# Patient Record
Sex: FEMALE | Race: WHITE | NOT HISPANIC OR LATINO | Employment: OTHER | URBAN - METROPOLITAN AREA
[De-identification: names, ages, dates, MRNs, and addresses within clinical notes are randomized per-mention and may not be internally consistent; named-entity substitution may affect disease eponyms.]

---

## 2017-02-22 ENCOUNTER — TELEPHONE (OUTPATIENT)
Dept: FAMILY MEDICINE | Facility: CLINIC | Age: 67
End: 2017-02-22

## 2017-02-23 ENCOUNTER — TRANSFERRED RECORDS (OUTPATIENT)
Dept: FAMILY MEDICINE | Facility: CLINIC | Age: 67
End: 2017-02-23

## 2017-04-03 ENCOUNTER — OFFICE VISIT (OUTPATIENT)
Dept: FAMILY MEDICINE | Facility: CLINIC | Age: 67
End: 2017-04-03

## 2017-04-03 VITALS
SYSTOLIC BLOOD PRESSURE: 104 MMHG | BODY MASS INDEX: 35.82 KG/M2 | TEMPERATURE: 98.2 F | WEIGHT: 202.2 LBS | HEART RATE: 86 BPM | OXYGEN SATURATION: 98 % | DIASTOLIC BLOOD PRESSURE: 66 MMHG | RESPIRATION RATE: 16 BRPM

## 2017-04-03 DIAGNOSIS — K52.831 COLLAGENOUS COLITIS: Primary | ICD-10-CM

## 2017-04-03 PROCEDURE — 99213 OFFICE O/P EST LOW 20 MIN: CPT | Performed by: FAMILY MEDICINE

## 2017-04-03 RX ORDER — BUDESONIDE 3 MG/1
9 CAPSULE, COATED PELLETS ORAL EVERY MORNING
Qty: 90 CAPSULE | Refills: 0 | Status: SHIPPED | OUTPATIENT
Start: 2017-04-03 | End: 2017-07-10

## 2017-04-03 NOTE — PROGRESS NOTES
"Has abnormal BMs    Bowel movements \"okay BUT\"    miralax for constipation  Is unhappy with the Bowel movements   Mucous in stool and loose   Has known internal hemorrhoids and has skin tags    Has been having some blood and when passing gas has loose bm    ro s  no weight loss   No black stools   no abd pain  Blood is on the TP  notin the stool    Past Medical History:   Diagnosis Date     CAD (coronary artery disease)     based on CT angio multiple vessels     Collagenous colitis 2010    Documented on colonoscopy with biopsy     Ex-smoker      Gastro-oesophageal reflux disease      History of stroke 2009    L MCA , had embolectomy and Tpa     Hyperlipidemia LDL goal < 100      OA (osteoarthritis) of knee     ref  to [facility] 10/11// Dr MARK Tobin in 2014     Osteopenia     fosamax started 2011- will use until 2016     PFO (patent foramen ovale)     no treatment recommended, but pt could choose to close     Unspecified cerebral artery occlusion with cerebral infarction     2009     Past Surgical History:   Procedure Laterality Date     ARTHROPLASTY KNEE  7/8/2014    Procedure: ARTHROPLASTY KNEE;  Surgeon: Brian Tobin MD;  [facility]     ARTHROPLASTY KNEE Right 9/9/2014    Procedure: ARTHROPLASTY KNEE;  Surgeon: Brian Tobin MD;  Location: [facility]     JOINT REPLACEMENT, HIP RT/LT Right     Joint Replacement Hip RT/LT     Current Outpatient Prescriptions   Medication     budesonide (ENTOCORT EC) 3 MG EC capsule     Calcium-Vitamin D 600-200 MG-UNIT TABS     aspirin 325 MG tablet     omeprazole (PRILOSEC) 40 MG capsule     simvastatin (ZOCOR) 10 MG tablet     Cholecalciferol (VITAMIN D3 PO)     polyethylene glycol (MIRALAX) powder     [DISCONTINUED] simvastatin (ZOCOR) 10 MG tablet     No current facility-administered medications for this visit.        /66 (Cuff Size: Adult Large)  Pulse 86  Temp 98.2  F (36.8  C) (Oral)  Resp 16  Wt 91.7 kg (202 lb 3.2 oz)  SpO2 98%  BMI 35.82 " "kg/m2      VS noted obese  Looks well  EYES = NL  EARS= NL  MOUTH =NL  NECK = NL  LUNGS=NL  COR=NL  ABD=NL, obese soft NT no masses  EXT=NL   MOOD AFFECT =NL  .med    .prudencio  (K52.831) Collagenous colitis  (primary encounter diagnosis)  LIKELY as this is what she had before and this is the \"SAME\"  Plan: budesonide (ENTOCORT EC) 3 MG EC capsule  3 capsules a day.  rtc in two week    If not resolved needs repeat colonoscopy     Jaylen Hubbard MD        "

## 2017-04-03 NOTE — MR AVS SNAPSHOT
"              After Visit Summary   4/3/2017    Kylee Cavazos    MRN: [id number]           Patient Information     Date Of Birth          1950        Visit Information        Provider Department      4/3/2017 9:30 AM Moe Moody MD [facility]        Today's Diagnoses     Collagenous colitis    -  1       Follow-ups after your visit        Who to contact     If you have questions or need follow up information about today's clinic visit or your schedule please contact [facility] directly at [phone].  Normal or non-critical lab and imaging results will be communicated to you by Leon[employer] World[employer] Corporationhart, letter or phone within 4 business days after the clinic has received the results. If you do not hear from us within 7 days, please contact the clinic through [employer] or phone. If you have a critical or abnormal lab result, we will notify you by phone as soon as possible.  Submit refill requests through Zinch or call your pharmacy and they will forward the refill request to us. Please allow 3 business days for your refill to be completed.          Additional Information About Your Visit        MyChart Information     Zinch lets you send messages to your doctor, view your test results, renew your prescriptions, schedule appointments and more. To sign up, go to [email or web address] . Click on \"Log in\" on the left side of the screen, which will take you to the Welcome page. Then click on \"Sign up Now\" on the right side of the page.     You will be asked to enter the access code listed below, as well as some personal information. Please follow the directions to create your username and password.     Your access code is: [email or web address]  Expires: 2017  9:56 AM     Your access code will  in 90 days. If you need help or a new code, please call your [facility] or [phone].        Care EveryWhere ID     This is your Care EveryWhere ID. This could be used by other " organizations to access your [place] medical records  [id number]        Your Vitals Were     Pulse Temperature Respirations Pulse Oximetry BMI (Body Mass Index)       86 98.2  F (36.8  C) (Oral) 16 98% 35.82 kg/m2        Blood Pressure from Last 3 Encounters:   04/03/17 104/66   10/20/16 110/72   10/06/16 118/68    Weight from Last 3 Encounters:   04/03/17 91.7 kg (202 lb 3.2 oz)   10/06/16 90.2 kg (198 lb 12.8 oz)   07/08/15 87.1 kg (192 lb)              Today, you had the following     No orders found for display         Today's Medication Changes          These changes are accurate as of: 4/3/17  9:56 AM.  If you have any questions, ask your nurse or doctor.               Start taking these medicines.        Dose/Directions    budesonide 3 MG EC capsule   Commonly known as:  ENTOCORT EC   Used for:  Collagenous colitis   Started by:  Moe Moody MD        Dose:  9 mg   Take 3 capsules (9 mg) by mouth every morning   Quantity:  90 capsule   Refills:  0            Where to get your medicines      These medications were sent to [facility] [address] [place] [address] [address] [address]    Hours:  24-hours Phone:  [phone]     budesonide 3 MG EC capsule                Primary Care Provider Office Phone # Fax #    Moe Moody [phone]       [facility] [address] [place] [address]        Thank you!     Thank you for choosing [facility]  for your care. Our goal is always to provide you with excellent care. Hearing back from our patients is one way we can continue to improve our services. Please take a few minutes to complete the written survey that you may receive in the mail after your visit with us. Thank you!             Your Updated Medication List - Protect others around you: Learn how to safely use, store and throw away your medicines at www.disposemymeds.org.          This  list is accurate as of: 4/3/17  9:56 AM.  Always use your most recent med list.                   Brand Name Dispense Instructions for use    aspirin 325 MG tablet     20 tablet    Take 1 tablet (325 mg) by mouth daily       budesonide 3 MG EC capsule    ENTOCORT EC    90 capsule    Take 3 capsules (9 mg) by mouth every morning       Calcium-Vitamin D 600-200 MG-UNIT Tabs      Take 2 tablets by mouth daily       MIRALAX powder   Generic drug:  polyethylene glycol      Take 1 capful by mouth daily.       omeprazole 40 MG capsule    priLOSEC    90 capsule    TAKE ONE CAPSULE BY MOUTH ONCE DAILY 30 TO 60 MINUTES BEFORE A MEAL       simvastatin 10 MG tablet    ZOCOR    90 tablet    Take 1 tablet (10 mg) by mouth At Bedtime       VITAMIN D3 PO      Take 4,000 Units by mouth daily

## 2017-04-17 ENCOUNTER — OFFICE VISIT (OUTPATIENT)
Dept: FAMILY MEDICINE | Facility: CLINIC | Age: 67
End: 2017-04-17

## 2017-04-17 VITALS
DIASTOLIC BLOOD PRESSURE: 70 MMHG | OXYGEN SATURATION: 99 % | WEIGHT: 200 LBS | BODY MASS INDEX: 35.43 KG/M2 | SYSTOLIC BLOOD PRESSURE: 116 MMHG | HEART RATE: 57 BPM

## 2017-04-17 DIAGNOSIS — K52.831 COLLAGENOUS COLITIS: ICD-10-CM

## 2017-04-17 DIAGNOSIS — K62.5 RECTAL BLEEDING: ICD-10-CM

## 2017-04-17 DIAGNOSIS — K63.9 BOWEL TROUBLE: Primary | ICD-10-CM

## 2017-04-17 LAB
% GRANULOCYTES: 63.1 % (ref 42.2–75.2)
HCT VFR BLD AUTO: 37.4 % (ref 35–46)
HEMOGLOBIN: 12.1 G/DL (ref 11.8–15.5)
LYMPHOCYTES NFR BLD AUTO: 26.8 % (ref 20.5–51.1)
MCH RBC QN AUTO: 29.5 PG (ref 27–31)
MCHC RBC AUTO-ENTMCNC: 32.6 G/DL (ref 33–37)
MCV RBC AUTO: 90.5 FL (ref 80–100)
MONOCYTES NFR BLD AUTO: 10.1 % (ref 1.7–9.3)
PLATELET # BLD AUTO: 284 K/UL (ref 140–450)
RBC # BLD AUTO: 4.12 X10/CMM (ref 3.7–5.2)
WBC # BLD AUTO: 5.9 X10/CMM (ref 3.8–11)

## 2017-04-17 PROCEDURE — 85025 COMPLETE CBC W/AUTO DIFF WBC: CPT | Performed by: FAMILY MEDICINE

## 2017-04-17 PROCEDURE — 84443 ASSAY THYROID STIM HORMONE: CPT | Mod: 90 | Performed by: FAMILY MEDICINE

## 2017-04-17 PROCEDURE — 99213 OFFICE O/P EST LOW 20 MIN: CPT | Performed by: FAMILY MEDICINE

## 2017-04-17 PROCEDURE — 36415 COLL VENOUS BLD VENIPUNCTURE: CPT | Performed by: FAMILY MEDICINE

## 2017-04-17 NOTE — LETTER
[facility] [address] [place]  [address]  Phone: [phone]    April 18, 2017      [place] [address] [place] [address]              Dear Kylee,    The thyroid and the CBC are normal. Follow up with GI as planned.       Sincerely,     Moe Hubbard M.D.    Results for orders placed or performed in visit on 04/17/17   CBC with Diff/Plt ([facility])   Result Value Ref Range    WBC x10/cmm 5.9 3.8 - 11.0 x10/cmm    % Lymphocytes 26.8 20.5 - 51.1 %    % Monocytes 10.1 (A) 1.7 - 9.3 %    % Granulocytes 63.1 42.2 - 75.2 %    RBC x10/cmm 4.12 3.7 - 5.2 x10/cmm    Hemoglobin 12.1 11.8 - 15.5 g/dl    Hematocrit 37.4 35 - 46 %    MCV 90.5 80 - 100 fL    MCH 29.5 27.0 - 31.0 pg    MCHC 32.6 (A) 33.0 - 37.0 g/dL    Platelet Count 284 140 - 450 K/uL   TSH ([facility])   Result Value Ref Range    TSH 2.630 0.450 - 4.500 uIU/mL    Narrative    Performed at:  01 - [facility] [address], [place], [place]  [id number]  : Ruy Arango MD, Phone:  [phone]

## 2017-04-17 NOTE — MR AVS SNAPSHOT
After Visit Summary   4/17/2017    Kylee Cavazos    MRN: [id number]           Patient Information     Date Of Birth          1950        Visit Information        Provider Department      4/17/2017 9:30 AM Moe Moody MD [facility]        Today's Diagnoses     Bowel trouble    -  1    Colitis        Rectal bleeding           Follow-ups after your visit        Additional Services     GASTROENTEROLOGY ADULT REF CONSULT ONLY       Preferred Location: [facility] [phone]   Colonoscopy followed by meeting with  gastroenerologist .    Please be aware that coverage of these services is subject to the terms and limitations of your health insurance plan.  Call member services at your health plan with any benefit or coverage questions.  Any procedures must be performed at a [place] facility OR coordinated by your clinic's referral office.    Please bring the following with you to your appointment:    (1) Any X-Rays, CTs or MRIs which have been performed.  Contact the facility where they were done to arrange for  prior to your scheduled appointment.    (2) List of current medications   (3) This referral request   (4) Any documents/labs given to you for this referral                  Who to contact     If you have questions or need follow up information about today's clinic visit or your schedule please contact [facility] directly at [phone].  Normal or non-critical lab and imaging results will be communicated to you by MyChart, letter or phone within 4 business days after the clinic has received the results. If you do not hear from us within 7 days, please contact the clinic through MyChart or phone. If you have a critical or abnormal lab result, we will notify you by phone as soon as possible.  Submit refill requests through [employer] or call your pharmacy and they will forward the refill request to us. Please allow 3 business days for your refill to be  "completed.          Additional Information About Your Visit        [employer]ul[employer] Network Information     [employer] lets you send messages to your doctor, view your test results, renew your prescriptions, schedule appointments and more. To sign up, go to [email or web address] . Click on \"Log in\" on the left side of the screen, which will take you to the Welcome page. Then click on \"Sign up Now\" on the right side of the page.     You will be asked to enter the access code listed below, as well as some personal information. Please follow the directions to create your username and password.     Your access code is: [email or web address]  Expires: 2017  9:56 AM     Your access code will  in 90 days. If you need help or a new code, please call your [place] clinic or [phone].        Care EveryWhere ID     This is your Care EveryWhere ID. This could be used by other organizations to access your [place] medical records  [id number]        Your Vitals Were     Pulse Pulse Oximetry BMI (Body Mass Index)             57 99% 35.43 kg/m2          Blood Pressure from Last 3 Encounters:   17 116/70   17 104/66   10/20/16 110/72    Weight from Last 3 Encounters:   17 90.7 kg (200 lb)   17 91.7 kg (202 lb 3.2 oz)   10/06/16 90.2 kg (198 lb 12.8 oz)              We Performed the Following     CBC with Diff/Plt ([facility])     GASTROENTEROLOGY ADULT REF CONSULT ONLY     TSH ([facility])        Primary Care Provider Office Phone # Fax #    Moe Moody [phone]       [facility] [address]  [facility] [address]        Thank you!     Thank you for choosing [facility]  for your care. Our goal is always to provide you with excellent care. Hearing back from our patients is one way we can continue to improve our services. Please take a few minutes to complete the written survey that you may receive in the mail after your visit with us. Thank you!             Your Updated " Medication List - Protect others around you: Learn how to safely use, store and throw away your medicines at www.disposemymeds.org.          This list is accurate as of: 4/17/17  9:38 AM.  Always use your most recent med list.                   Brand Name Dispense Instructions for use    aspirin 325 MG tablet     20 tablet    Take 1 tablet (325 mg) by mouth daily       budesonide 3 MG EC capsule    ENTOCORT EC    90 capsule    Take 3 capsules (9 mg) by mouth every morning       Calcium-Vitamin D 600-200 MG-UNIT Tabs      Take 2 tablets by mouth daily       MIRALAX powder   Generic drug:  polyethylene glycol      Take 1 capful by mouth daily.       omeprazole 40 MG capsule    priLOSEC    90 capsule    TAKE ONE CAPSULE BY MOUTH ONCE DAILY 30 TO 60 MINUTES BEFORE A MEAL       simvastatin 10 MG tablet    ZOCOR    90 tablet    Take 1 tablet (10 mg) by mouth At Bedtime       VITAMIN D3 PO      Take 4,000 Units by mouth daily

## 2017-04-17 NOTE — PROGRESS NOTES
Subjective:  Here to discuss the status of the following problem(s):(Unless noted the problem(s) is/are stable and if applicable the medicine(s) being used is/are causing no side effects or problems.)    CC: Diarrhea (bleeding F/U )        3. Collagenous colitis  Bowel  Issue      Hist of colitis collagenous  i restarted Entocort 9 mg /day and she did not feel it helped like before      bm 2-/days smaller  Has a lttle watery squirt in beginning of movement  miralax uses as this feels better , less feeling of fullness   No hard stools no bloating        ROS:  General:  NEGATIVE for fever, chills, change in weight GI: no mucous    Past Medical History:   Diagnosis Date     CAD (coronary artery disease)     based on CT angio multiple vessels     Collagenous colitis 2010    Documented on colonoscopy with biopsy     Ex-smoker      Gastro-oesophageal reflux disease      History of stroke 2009    L MCA , had embolectomy and Tpa     Hyperlipidemia LDL goal < 100      OA (osteoarthritis) of knee     ref  to [facility] 10/11// Dr MARK Tobin in 2014     Osteopenia     fosamax started 2011- will use until 2016     PFO (patent foramen ovale)     no treatment recommended, but pt could choose to close     Unspecified cerebral artery occlusion with cerebral infarction     2009     Current Outpatient Prescriptions   Medication     budesonide (ENTOCORT EC) 3 MG EC capsule     Calcium-Vitamin D 600-200 MG-UNIT TABS     aspirin 325 MG tablet     omeprazole (PRILOSEC) 40 MG capsule     simvastatin (ZOCOR) 10 MG tablet     Cholecalciferol (VITAMIN D3 PO)     polyethylene glycol (MIRALAX) powder     No current facility-administered medications for this visit.       reports that she quit smoking about 7 years ago. Her smoking use included Cigarettes. She quit after 35.00 years of use. She has never used smokeless tobacco. She reports that she drinks about 3.0 oz of alcohol per week  She reports that she does not use illicit  drugs.      EXAM:  BP: 116/70   Pulse: 57      Wt Readings from Last 2 Encounters:   04/17/17 90.7 kg (200 lb)   04/03/17 91.7 kg (202 lb 3.2 oz)       BMI= Body mass index is 35.43 kg/(m^2).    EXAM:   APPEARANCE: = Nrl  Conj/Eyelids = Nrl  Ears/Nose = Nrl  Neck = Nrl  Resp effort = Nrl  Digits and Nails =Nrl  SKIN absent significant rashes or lesions = Nrl    Mood/Affect = Nrl    Assessment/Plan:  Kylee was seen today for diarrhea.    Diagnoses and all orders for this visit:    Bowel trouble  -     CBC with Diff/Plt ([facility])  -     TSH ([facility])    Rectal bleeding  -     GASTROENTEROLOGY ADULT REF CONSULT ONLY    Collagenous colitis  This may be the issue needs re eval withy the blood in stool   Will consult with Gi Post procedure at some point            Moe Moody  [facility]

## 2017-04-18 LAB — TSH BLD-ACNC: 2.63 UIU/ML (ref 0.45–4.5)

## 2017-04-28 ENCOUNTER — TRANSFERRED RECORDS (OUTPATIENT)
Dept: FAMILY MEDICINE | Facility: CLINIC | Age: 67
End: 2017-04-28

## 2017-05-03 ENCOUNTER — TRANSFERRED RECORDS (OUTPATIENT)
Dept: FAMILY MEDICINE | Facility: CLINIC | Age: 67
End: 2017-05-03

## 2017-06-19 ENCOUNTER — TRANSFERRED RECORDS (OUTPATIENT)
Dept: FAMILY MEDICINE | Facility: CLINIC | Age: 67
End: 2017-06-19

## 2017-06-23 DIAGNOSIS — K51.218 ULCERATIVE PROCTITIS WITH OTHER COMPLICATION (H): Primary | ICD-10-CM

## 2017-07-10 ENCOUNTER — OFFICE VISIT (OUTPATIENT)
Dept: FAMILY MEDICINE | Facility: CLINIC | Age: 67
End: 2017-07-10

## 2017-07-10 VITALS
BODY MASS INDEX: 35.61 KG/M2 | SYSTOLIC BLOOD PRESSURE: 122 MMHG | DIASTOLIC BLOOD PRESSURE: 70 MMHG | WEIGHT: 201 LBS | OXYGEN SATURATION: 99 % | RESPIRATION RATE: 16 BRPM | HEART RATE: 55 BPM

## 2017-07-10 DIAGNOSIS — K51.218 ULCERATIVE PROCTITIS WITH OTHER COMPLICATION (H): ICD-10-CM

## 2017-07-10 DIAGNOSIS — H61.21 IMPACTED CERUMEN OF RIGHT EAR: Primary | ICD-10-CM

## 2017-07-10 PROCEDURE — 99212 OFFICE O/P EST SF 10 MIN: CPT | Mod: 25 | Performed by: FAMILY MEDICINE

## 2017-07-10 PROCEDURE — 69210 REMOVE IMPACTED EAR WAX UNI: CPT | Performed by: FAMILY MEDICINE

## 2017-07-10 NOTE — PROGRESS NOTES
Right ear full/ plugged  Can't hear   plugged 10 days  Using debrox  Has been able to pop it occasionally  No ear pain    Colitis-  Is on prednisone and following with GI less diarrhea    Past Medical History:   Diagnosis Date     CAD (coronary artery disease)     based on CT angio multiple vessels     Collagenous colitis 2010    Documented on colonoscopy with biopsy     Ex-smoker      Gastro-oesophageal reflux disease      History of stroke 2009    L MCA , had embolectomy and Tpa     Hyperlipidemia LDL goal < 100      OA (osteoarthritis) of knee     ref  to [facility] 10/11// Dr MARK Tobin in 2014     Osteopenia     fosamax started 2011- will use until 2016     PFO (patent foramen ovale)     no treatment recommended, but pt could choose to close     Ulcerative colitis with rectal bleeding (H) 2017    biopsy at colonoscopy - consistent with Ulcerative colitis. treated with pred and suggested Rowasa enemas, if not helpful may need immunosupression.     Unspecified cerebral artery occlusion with cerebral infarction     2009       Past Surgical History:   Procedure Laterality Date     ARTHROPLASTY KNEE  7/8/2014    Procedure: ARTHROPLASTY KNEE;  Surgeon: Brian Tobin MD;  Location: [facility]     ARTHROPLASTY KNEE Right 9/9/2014    Procedure: ARTHROPLASTY KNEE;  Surgeon: Brian Tobin MD;  Location: [facility] OR     JOINT REPLACEMENT, HIP RT/LT Right     Joint Replacement Hip RT/LT     Current Outpatient Prescriptions   Medication     Carbamide Peroxide (DEBROX OT)     Mesalamine 4 GM/60ML SF ENEM     Calcium-Vitamin D 600-200 MG-UNIT TABS     aspirin 325 MG tablet     omeprazole (PRILOSEC) 40 MG capsule     simvastatin (ZOCOR) 10 MG tablet     Cholecalciferol (VITAMIN D3 PO)     polyethylene glycol (MIRALAX) powder     [DISCONTINUED] omeprazole (PRILOSEC) 40 MG capsule     No current facility-administered medications for this visit.      /70  Pulse 55  Resp 16  Wt 91.2 kg (201 lb)  SpO2 99%  BMI 35.61  kg/m2  Looks well  Right ear with deebris   Waxy and sheets of tissue    PROCEDURE -   Ear irrigation right  Both warm water and curetting required to remove the wax   From right ear canal  Tm looks nl    Hearing tested and is normal at all freq except left at 4000 Hz         ASSESSMENT / PLAN    (H61.21) Impacted cerumen of right ear  (primary encounter diagnosis)  Mild hearing loss on the left  Wax resolved, rec hearing eval    (K51.218) Ulcerative proctitis with other complication (H)  Being treated and is doing better.   Jalyen Hubbard MD

## 2017-07-10 NOTE — MR AVS SNAPSHOT
"              After Visit Summary   7/10/2017    Kylee Cavazos    MRN: [id number]           Patient Information     Date Of Birth          1950        Visit Information        Provider Department      7/10/2017 4:15 PM Moe Moody MD [facility]        Today's Diagnoses     Impacted cerumen of right ear    -  1    Ulcerative proctitis with other complication (H)           Follow-ups after your visit        Who to contact     If you have questions or need follow up information about today's clinic visit or your schedule please contact [facility] directly at [phone].  Normal or non-critical lab and imaging results will be communicated to you by official.fmhart, letter or phone within 4 business days after the clinic has received the results. If you do not hear from us within 7 days, please contact the clinic through [employer] or phone. If you have a critical or abnormal lab result, we will notify you by phone as soon as possible.  Submit refill requests through [email or web address] or call your pharmacy and they will forward the refill request to us. Please allow 3 business days for your refill to be completed.          Additional Information About Your Visit        MyChart Information     [email or web address] lets you send messages to your doctor, view your test results, renew your prescriptions, schedule appointments and more. To sign up, go to [email or web address] . Click on \"Log in\" on the left side of the screen, which will take you to the Welcome page. Then click on \"Sign up Now\" on the right side of the page.     You will be asked to enter the access code listed below, as well as some personal information. Please follow the directions to create your username and password.     Your access code is: [email or web address]  Expires: 10/8/2017  4:43 PM     Your access code will  in 90 days. If you need help or a new code, please call your [place] clinic or [phone].        Care EveryWhere ID     " This is your Care EveryWhere ID. This could be used by other organizations to access your [place] medical records  [id number]        Your Vitals Were     Pulse Respirations Pulse Oximetry BMI (Body Mass Index)          55 16 99% 35.61 kg/m2         Blood Pressure from Last 3 Encounters:   07/10/17 122/70   04/17/17 116/70   04/03/17 104/66    Weight from Last 3 Encounters:   07/10/17 91.2 kg (201 lb)   04/17/17 90.7 kg (200 lb)   04/03/17 91.7 kg (202 lb 3.2 oz)              We Performed the Following     REMOVE IMPACTED [facility]        Primary Care Provider Office Phone # Fax #    Moe Moody [phone]       [facility] [address]  [facility] [address]        Equal Access to Services     JESSICA FLORES : Hadii aad ku hadasho Soomaali, waaxda luqadaha, qaybta kaalmada adeegyada, waxay rajanin hayaajonah cevallos . So [facility] [phone].    ATENCIÓN: Si habla español, tiene a duncan disposición servicios gratuitos de asistencia lingüística. Llame al [phone].    We comply with applicable federal civil rights laws and [place] laws. We do not discriminate on the basis of race, color, national origin, age, disability sex, sexual orientation or gender identity.            Thank you!     Thank you for choosing [facility]  for your care. Our goal is always to provide you with excellent care. Hearing back from our patients is one way we can continue to improve our services. Please take a few minutes to complete the written survey that you may receive in the mail after your visit with us. Thank you!             Your Updated Medication List - Protect others around you: Learn how to safely use, store and throw away your medicines at www.disposemymeds.org.          This list is accurate as of: 7/10/17  4:43 PM.  Always use your most recent med list.                   Brand Name Dispense Instructions for use Diagnosis    aspirin 325 MG tablet     20 tablet    Take  1 tablet (325 mg) by mouth daily        Calcium-Vitamin D 600-200 MG-UNIT Tabs      Take 2 tablets by mouth daily        DEBROX OT      Place in ear(s) 2 times daily as needed        Mesalamine 4 GM/60ML SF Enem      Use nightly    Ulcerative proctitis with other complication (H)       MIRALAX powder   Generic drug:  polyethylene glycol      Take 1 capful by mouth daily.        omeprazole 40 MG capsule    priLOSEC    90 capsule    TAKE ONE CAPSULE BY MOUTH ONCE DAILY 30 TO 60 MINUTES BEFORE A MEAL    Gastroesophageal reflux disease without esophagitis       simvastatin 10 MG tablet    ZOCOR    90 tablet    Take 1 tablet (10 mg) by mouth At Bedtime    Hyperlipidemia LDL goal < 100, Coronary artery disease due to calcified coronary lesion       VITAMIN D3 PO      Take 4,000 Units by mouth daily

## 2017-08-14 ENCOUNTER — TRANSFERRED RECORDS (OUTPATIENT)
Dept: FAMILY MEDICINE | Facility: CLINIC | Age: 67
End: 2017-08-14

## 2017-11-07 DIAGNOSIS — E78.5 HYPERLIPIDEMIA WITH TARGET LDL LESS THAN 100: Primary | ICD-10-CM

## 2017-11-07 PROCEDURE — 36415 COLL VENOUS BLD VENIPUNCTURE: CPT | Performed by: FAMILY MEDICINE

## 2017-11-07 PROCEDURE — 80061 LIPID PANEL: CPT | Mod: 90 | Performed by: FAMILY MEDICINE

## 2017-11-07 PROCEDURE — 80053 COMPREHEN METABOLIC PANEL: CPT | Mod: 90 | Performed by: FAMILY MEDICINE

## 2017-11-08 LAB
ALBUMIN SERPL-MCNC: 4 G/DL (ref 3.6–4.8)
ALBUMIN/GLOB SERPL: 1.5 {RATIO} (ref 1.2–2.2)
ALP SERPL-CCNC: 74 IU/L (ref 39–117)
ALT SERPL-CCNC: 8 IU/L (ref 0–32)
AST SERPL-CCNC: 20 IU/L (ref 0–40)
BILIRUB SERPL-MCNC: 0.5 MG/DL (ref 0–1.2)
BUN SERPL-MCNC: 16 MG/DL (ref 8–27)
BUN/CREATININE RATIO: 17 (ref 12–28)
CALCIUM SERPL-MCNC: 9 MG/DL (ref 8.7–10.3)
CHLORIDE SERPLBLD-SCNC: 101 MMOL/L (ref 96–106)
CHOLEST SERPL-MCNC: 162 MG/DL (ref 100–199)
CREAT SERPL-MCNC: 0.96 MG/DL (ref 0.57–1)
EGFR IF AFRICN AM: 70 ML/MIN/1.73
EGFR IF NONAFRICN AM: 61 ML/MIN/1.73
GLOBULIN, TOTAL: 2.7 G/DL (ref 1.5–4.5)
GLUCOSE SERPL-MCNC: 84 MG/DL (ref 65–99)
HDLC SERPL-MCNC: 56 MG/DL
LDL/HDL RATIO: 1.6 RATIO UNITS (ref 0–3.2)
LDLC SERPL CALC-MCNC: 88 MG/DL (ref 0–99)
POTASSIUM SERPL-SCNC: 5.1 MMOL/L (ref 3.5–5.2)
PROT SERPL-MCNC: 6.7 G/DL (ref 6–8.5)
SODIUM SERPL-SCNC: 141 MMOL/L (ref 134–144)
TOTAL CO2: 25 MMOL/L (ref 18–28)
TRIGL SERPL-MCNC: 91 MG/DL (ref 0–149)
VLDLC SERPL CALC-MCNC: 18 MG/DL (ref 5–40)

## 2017-11-14 ENCOUNTER — OFFICE VISIT (OUTPATIENT)
Dept: FAMILY MEDICINE | Facility: CLINIC | Age: 67
End: 2017-11-14

## 2017-11-14 VITALS
OXYGEN SATURATION: 97 % | RESPIRATION RATE: 20 BRPM | DIASTOLIC BLOOD PRESSURE: 68 MMHG | BODY MASS INDEX: 37.54 KG/M2 | HEART RATE: 56 BPM | HEIGHT: 62 IN | WEIGHT: 204 LBS | SYSTOLIC BLOOD PRESSURE: 116 MMHG

## 2017-11-14 DIAGNOSIS — M85.80 OSTEOPENIA, UNSPECIFIED LOCATION: ICD-10-CM

## 2017-11-14 DIAGNOSIS — E78.5 HYPERLIPIDEMIA WITH TARGET LDL LESS THAN 100: ICD-10-CM

## 2017-11-14 DIAGNOSIS — I25.10 CORONARY ARTERY DISEASE INVOLVING NATIVE CORONARY ARTERY OF NATIVE HEART WITHOUT ANGINA PECTORIS: ICD-10-CM

## 2017-11-14 DIAGNOSIS — Z78.0 MENOPAUSE: ICD-10-CM

## 2017-11-14 DIAGNOSIS — K51.218 ULCERATIVE PROCTITIS WITH OTHER COMPLICATION (H): ICD-10-CM

## 2017-11-14 DIAGNOSIS — K21.9 GASTROESOPHAGEAL REFLUX DISEASE WITHOUT ESOPHAGITIS: ICD-10-CM

## 2017-11-14 DIAGNOSIS — K21.9 GASTROESOPHAGEAL REFLUX DISEASE, ESOPHAGITIS PRESENCE NOT SPECIFIED: ICD-10-CM

## 2017-11-14 DIAGNOSIS — R60.0 EDEMA OF BOTH LEGS: ICD-10-CM

## 2017-11-14 DIAGNOSIS — Z86.73 HISTORY OF STROKE: ICD-10-CM

## 2017-11-14 DIAGNOSIS — Z00.00 MEDICARE ANNUAL WELLNESS VISIT, SUBSEQUENT: Primary | ICD-10-CM

## 2017-11-14 PROCEDURE — G0439 PPPS, SUBSEQ VISIT: HCPCS | Performed by: FAMILY MEDICINE

## 2017-11-14 PROCEDURE — 99215 OFFICE O/P EST HI 40 MIN: CPT | Mod: 25 | Performed by: FAMILY MEDICINE

## 2017-11-14 RX ORDER — OMEPRAZOLE 40 MG/1
CAPSULE, DELAYED RELEASE ORAL
Qty: 90 CAPSULE | Refills: 3 | Status: SHIPPED | OUTPATIENT
Start: 2017-11-14 | End: 2019-10-09

## 2017-11-14 RX ORDER — SIMVASTATIN 10 MG
10 TABLET ORAL AT BEDTIME
Qty: 90 TABLET | Refills: 3 | Status: SHIPPED | OUTPATIENT
Start: 2017-11-14 | End: 2018-11-15

## 2017-11-14 NOTE — MR AVS SNAPSHOT
After Visit Summary   11/14/2017    Kylee Cavazos    MRN: [id number]           Patient Information     Date Of Birth          1950        Visit Information        Provider Department      11/14/2017 1:15 PM Moe Moody MD [facility]        Today's Diagnoses     Medicare annual wellness visit, subsequent    -  1    Osteopenia        Gastroesophageal reflux disease, esophagitis presence not specified        Hyperlipidemia with target LDL less than 100        History of stroke        Coronary artery disease involving native coronary artery of native heart without angina pectoris        Ulcerative proctitis with other complication (H)        Menopause          Care Instructions      This is a place you can go to be measured for Jobst or other type of compression stockings for edema.     [facility]:          [facility] [address], [address]                                    [place] [address]                                    Phone:  [phone]                                    Fax:  [phone]                                    Billing office:  [phone]    Preventive Health Recommendations    Female Ages 65 +    Yearly exam:     See your health care provider every year in order to  o Review health changes.   o Discuss preventive care.    o Review your medicines if your doctor has prescribed any.      You no longer need a yearly Pap test unless you've had an abnormal Pap test in the past 10 years. If you have vaginal symptoms, such as bleeding or discharge, be sure to talk with your provider about a Pap test.      Every 1 to 2 years, have a mammogram.  If you are over 69, talk with your health care provider about whether or not you want to continue having screening mammograms.      Every 10 years, have a colonoscopy. Or, have a yearly FIT test (stool test). These exams  will check for colon cancer.       Have a cholesterol test every 5 years, or more often if your doctor advises it.       Have a diabetes test (fasting glucose) every three years. If you are at risk for diabetes, you should have this test more often.       At age 65, have a bone density scan (DEXA) to check for osteoporosis (brittle bone disease).    Shots:    Get a flu shot each year.    Get a tetanus shot every 10 years.    Talk to your doctor about your pneumonia vaccines. There are now two you should receive - Pneumovax (PPSV 23) and Prevnar (PCV 13).    Talk to your doctor about the shingles vaccine.    Talk to your doctor about the hepatitis B vaccine.    Nutrition:     Eat at least 5 servings of fruits and vegetables each day.      Eat whole-grain bread, whole-wheat pasta and brown rice instead of white grains and rice.      Talk to your provider about Calcium and Vitamin D.     Lifestyle    Exercise at least 150 minutes a week (30 minutes a day, 5 days a week). This will help you control your weight and prevent disease.      Limit alcohol to one drink per day.      No smoking.       Wear sunscreen to prevent skin cancer.       See your dentist twice a year for an exam and cleaning.    See your eye doctor every 1 to 2 years to screen for conditions such as glaucoma, macular degeneration and cataracts.  Lifestyle Changes for Controlling GERD  When you have GERD, stomach acid feels as if it s backing up toward your mouth. Whether or not you take medicine to control your GERD, your symptoms can often be improved with lifestyle changes. Talk to your healthcare provider about the following suggestions. These suggestions may help you get relief from your symptoms.      Raise your head  Reflux is more likely to strike when you re lying down flat, because stomach fluid can flow backward more easily. Raising the head of your bed 4 to 6 inches can help. To do this:  Slide blocks or books under the legs at the head of  your bed. Or, place a wedge under the mattress. Many foam stores can make a suitable wedge for you. The wedge should run from your waist to the top of your head.  Don t just prop your head on several pillows. This increases pressure on your stomach. It can make GERD worse.  Watch your eating habits  Certain foods may increase the acid in your stomach or relax the lower esophageal sphincter. This makes GERD more likely. It s best to avoid the following if they cause you symptoms:  Coffee, tea, and carbonated drinks (with and without caffeine)  Fatty, fried, or spicy food  Mint, chocolate, onions, and tomatoes  Peppermint  Any other foods that seem to irritate your stomach or cause you pain  Relieve the pressure  Tips include the following:  Eat smaller meals, even if you have to eat more often.  Don t lie down right after you eat. Wait a few hours for your stomach to empty.  Avoid tight belts and tight-fitting clothes.  Lose excess weight.  Tobacco and alcohol  Avoid smoking tobacco and drinking alcohol. They can make GERD symptoms worse.  Date Last Reviewed: 7/1/2016 2000-2017 [employer]. [address] [place] [address]. All rights reserved. This information is not intended as a substitute for professional medical care. Always follow your healthcare professional's instructions.                  Follow-ups after your visit        Future tests that were ordered for you today     Open Future Orders        Priority Expected Expires Ordered    DEXA - Hip/Pelvis/Spine (FUTURE/[facility]) Routine  11/14/2018 11/14/2017            Who to contact     If you have questions or need follow up information about today's clinic visit or your schedule please contact [facility] directly at [phone].  Normal or non-critical lab and imaging results will be communicated to you by MyChart, letter or phone within 4 business days after the clinic has received the results. If you do not hear  "from us within 7 days, please contact the clinic through [employer] it! or phone. If you have a critical or abnormal lab result, we will notify you by phone as soon as possible.  Submit refill requests through [employer] it! or call your pharmacy and they will forward the refill request to us. Please allow 3 business days for your refill to be completed.          Additional Information About Your Visit        [email or web address] Information     [employer] lets you send messages to your doctor, view your test results, renew your prescriptions, schedule appointments and more. To sign up, go to [email or web address] . Click on \"Log in\" on the left side of the screen, which will take you to the Welcome page. Then click on \"Sign up Now\" on the right side of the page.     You will be asked to enter the access code listed below, as well as some personal information. Please follow the directions to create your username and password.     Your access code is: [email or web address]  Expires: 2018  1:52 PM     Your access code will  in 90 days. If you need help or a new code, please call your [facility] clinic or [phone].        Care EveryWhere ID     This is your Care EveryWhere ID. This could be used by other organizations to access your [facility] medical records  [id number]        Your Vitals Were     Pulse Respirations Height Pulse Oximetry BMI (Body Mass Index)       56 20 1.575 m (5' 2\") 97% 37.31 kg/m2        Blood Pressure from Last 3 Encounters:   17 116/68   07/10/17 122/70   17 116/70    Weight from Last 3 Encounters:   17 92.5 kg (204 lb)   07/10/17 91.2 kg (201 lb)   17 90.7 kg (200 lb)               Primary Care Provider Office Phone # Fax #    Moe Moody [phone] [address]  [facility] [id number]        Equal Access to Services     GOPAL FLORES AH: Apolinar Lou, sakshi kaye, anna mendoza, ang baird. So [facility] [place] [facility] " [phone].    ATENCIÓN: Si prashanth magallanes, tiene a duncan disposición servicios gratuitos de asistencia lingüística. Emery ray [phone].    We comply with applicable federal civil rights laws and [place] laws. We do not discriminate on the basis of race, color, national origin, age, disability, sex, sexual orientation, or gender identity.            Thank you!     Thank you for choosing [facility]  for your care. Our goal is always to provide you with excellent care. Hearing back from our patients is one way we can continue to improve our services. Please take a few minutes to complete the written survey that you may receive in the mail after your visit with us. Thank you!             Your Updated Medication List - Protect others around you: Learn how to safely use, store and throw away your medicines at www.disposemymeds.org.          This list is accurate as of: 11/14/17  1:52 PM.  Always use your most recent med list.                   Brand Name Dispense Instructions for use Diagnosis    aspirin 325 MG tablet     20 tablet    Take 1 tablet (325 mg) by mouth daily        Calcium-Vitamin D 600-200 MG-UNIT Tabs      Take 2 tablets by mouth daily        DEBROX OT      Place in ear(s) 2 times daily as needed        Mesalamine 4 GM/60ML SF Enem      See Admin Instructions Uses every 4th  nightly    Ulcerative proctitis with other complication (H)       MIRALAX powder   Generic drug:  polyethylene glycol      Take 1 capful by mouth daily.        omeprazole 40 MG capsule    priLOSEC    90 capsule    TAKE ONE CAPSULE BY MOUTH ONCE DAILY 30 TO 60 MINUTES BEFORE A MEAL    Gastroesophageal reflux disease without esophagitis       simvastatin 10 MG tablet    ZOCOR    90 tablet    Take 1 tablet (10 mg) by mouth At Bedtime    Hyperlipidemia LDL goal < 100, Coronary artery disease due to calcified coronary lesion       VITAMIN D3 PO      Take 4,000 Units by mouth daily

## 2017-11-14 NOTE — PATIENT INSTRUCTIONS
This is a place you can go to be measured for Jobst or other type of compression stockings for edema.     [facility]:          [facility]                                     [facility] [address], [address]                                    [place] [address]                                    Phone:  [phone]                                    Fax:  [phone]                                    Billing office:  [phone]    Preventive Health Recommendations    Female Ages 65 +    Yearly exam:     See your health care provider every year in order to  o Review health changes.   o Discuss preventive care.    o Review your medicines if your doctor has prescribed any.      You no longer need a yearly Pap test unless you've had an abnormal Pap test in the past 10 years. If you have vaginal symptoms, such as bleeding or discharge, be sure to talk with your provider about a Pap test.      Every 1 to 2 years, have a mammogram.  If you are over 69, talk with your health care provider about whether or not you want to continue having screening mammograms.      Every 10 years, have a colonoscopy. Or, have a yearly FIT test (stool test). These exams will check for colon cancer.       Have a cholesterol test every 5 years, or more often if your doctor advises it.       Have a diabetes test (fasting glucose) every three years. If you are at risk for diabetes, you should have this test more often.       At age 65, have a bone density scan (DEXA) to check for osteoporosis (brittle bone disease).    Shots:    Get a flu shot each year.    Get a tetanus shot every 10 years.    Talk to your doctor about your pneumonia vaccines. There are now two you should receive - Pneumovax (PPSV 23) and Prevnar (PCV 13).    Talk to your doctor about the shingles vaccine.    Talk to your doctor about the hepatitis B vaccine.    Nutrition:     Eat at least 5 servings of fruits and vegetables  each day.      Eat whole-grain bread, whole-wheat pasta and brown rice instead of white grains and rice.      Talk to your provider about Calcium and Vitamin D.     Lifestyle    Exercise at least 150 minutes a week (30 minutes a day, 5 days a week). This will help you control your weight and prevent disease.      Limit alcohol to one drink per day.      No smoking.       Wear sunscreen to prevent skin cancer.       See your dentist twice a year for an exam and cleaning.    See your eye doctor every 1 to 2 years to screen for conditions such as glaucoma, macular degeneration and cataracts.  Lifestyle Changes for Controlling GERD  When you have GERD, stomach acid feels as if it s backing up toward your mouth. Whether or not you take medicine to control your GERD, your symptoms can often be improved with lifestyle changes. Talk to your healthcare provider about the following suggestions. These suggestions may help you get relief from your symptoms.      Raise your head  Reflux is more likely to strike when you re lying down flat, because stomach fluid can flow backward more easily. Raising the head of your bed 4 to 6 inches can help. To do this:  Slide blocks or books under the legs at the head of your bed. Or, place a wedge under the mattress. Many [employer] can make a suitable wedge for you. The wedge should run from your waist to the top of your head.  Don t just prop your head on several pillows. This increases pressure on your stomach. It can make GERD worse.  Watch your eating habits  Certain foods may increase the acid in your stomach or relax the lower esophageal sphincter. This makes GERD more likely. It s best to avoid the following if they cause you symptoms:  Coffee, tea, and carbonated drinks (with and without caffeine)  Fatty, fried, or spicy food  Mint, chocolate, onions, and tomatoes  Peppermint  Any other foods that seem to irritate your stomach or cause you pain  Relieve the pressure  Tips include  the following:  Eat smaller meals, even if you have to eat more often.  Don t lie down right after you eat. Wait a few hours for your stomach to empty.  Avoid tight belts and tight-fitting clothes.  Lose excess weight.  Tobacco and alcohol  Avoid smoking tobacco and drinking alcohol. They can make GERD symptoms worse.  Date Last Reviewed: 7/1/2016 2000-2017 [employer]. [address] [place] [address], [place] [address]. All rights reserved. This information is not intended as a substitute for professional medical care. Always follow your healthcare professional's instructions.

## 2017-11-14 NOTE — PROGRESS NOTES
SUBJECTIVE:   Kylee Cavazos is a 67 year old female who presents for Preventive Visit.  Labs done - results   Discuss next PCP choice  Also Multiple medical issues  See notes  History of coronary artery disease based on a CT angiogram that shows multiple vessels involved.  History of patent foramen ovale.  No treatment was recommended when she saw Dr. Abraham.  History of stroke, 2009 LEFT middle cerebral artery she had an embolectomy and TPA and was LEFT with no residuals.  The recommended treatment has been aspirin alone  Osteopenia Fosamax started in 2011 plan has been to used  Fosamax until 2016, no fractures. Last dexa a couple years ago   Ulcerative colitis with rectal bleeding 2017.  She had biopsy at colonoscopy proving colitis ulcerative colitis. Is doing mesalmine enema every four days    gerd ppi prn , pretty frequent use , gets symptoms without        Exercise Ivan    Back  Is okay but she is not working on strength     Are you in the first 1 2 months of your Medicare Part B coverage?  No    Healthy Habits:    Do you get at least three servings of calcium containing foods daily (dairy, green leafy vegetables, etc.)? no, taking calcium and/or vitamin D supplement: calcium & vitamin d    Amount of exercise or daily activities, outside of work: 4 day(s) per week    Problems taking medications regularly No    Medication side effects: No    Have you had an eye exam in the past two years? yes    Do you see a dentist twice per year? yes    Do you have sleep apnea, excessive snoring or daytime drowsiness?no    COGNITIVE SCREEN  1) Repeat 3 items (Banana, Sunrise, Chair)    2) Clock draw: NORMAL  3) 3 item recall: Recalls 3 objects  Results: 3 items recalled: COGNITIVE IMPAIRMENT LESS LIKELY    Mini-CogTM Copyright S Mekhi. Licensed by the author for use in [facility]; reprinted with permission (jonah@.[email or web address]). All rights reserved.            HEARING FREQUENCY TESTED BOTH  EARS:Failed  Right Ear/Left Ear      500 Hz:  Passed    1000 Hz: Passed   2000 Hz: Passed   4000 Hz: Passed  Has ringing in ears- see audiology        Reviewed and updated as needed this [facility]  Meds  Surg Hx            Reviewed and updated as needed this visit by Provider        Social History   Substance Use Topics     Smoking status: Former Smoker     Years: 35.00     Types: Cigarettes     Quit date: 5/1/2009     Smokeless tobacco: Never Used     Alcohol use 3.0 oz/week     5 Standard drinks or equivalent per week       The patient does not drink >3 drinks per day nor >7 drinks per week.   Past Medical History:   Diagnosis Date     CAD (coronary artery disease)     based on CT angio multiple vessels     Collagenous colitis 2010    Documented on colonoscopy with biopsy     Ex-smoker      Gastro-oesophageal reflux disease      History of stroke 2009    L MCA , had embolectomy and Tpa     Hyperlipidemia LDL goal < 100      OA (osteoarthritis) of knee     ref  to [facility] 10/11// Dr MARK Tobin in 2014     Osteopenia     fosamax started 2011- will use until 2016     PFO (patent foramen ovale)     no treatment recommended, but pt could choose to close     Ulcerative colitis with rectal bleeding (H) 2017    biopsy at colonoscopy - consistent with Ulcerative colitis. treated with pred and suggested Rowasa enemas, if not helpful may need immunosupression.     Unspecified cerebral artery occlusion with cerebral infarction     2009     Past Surgical History:   Procedure Laterality Date     ARTHROPLASTY KNEE  7/8/2014    Procedure: ARTHROPLASTY KNEE;  Surgeon: Brian Tobin MD;  Location:  OR     ARTHROPLASTY KNEE Right 9/9/2014    Procedure: ARTHROPLASTY KNEE;  Surgeon: Brian Tobin MD;  Location:  OR     DENTAL SURGERY Left 08/2017    left dental implant     JOINT REPLACEMENT, HIP RT/LT Right     Joint Replacement Hip RT/LT     Current Outpatient Prescriptions   Medication      Carbamide Peroxide (DEBROX OT)     Mesalamine 4 GM/60ML SF ENEM     Calcium-Vitamin D 600-200 MG-UNIT TABS     aspirin 325 MG tablet     omeprazole (PRILOSEC) 40 MG capsule     simvastatin (ZOCOR) 10 MG tablet     Cholecalciferol (VITAMIN D3 PO)     polyethylene glycol (MIRALAX) powder     No current facility-administered medications for this visit.        Today's PHQ-2 Score:   PHQ-2 ( 1999 Pfizer) 11/14/2017 10/6/2016   Q1: Little interest or pleasure in doing things 0 0   Q2: Feeling down, depressed or hopeless 0 0   PHQ-2 Score 0 0       Do you feel safe in your environment - Yes    Do you have a Health Care Directive?: Yes: Advance Directive has been received and scanned.    Current providers sharing in care for this patient include: Patient Care Team:  Moe Moody MD as PCP - General (Family Practice)      Hearing impairment: No    Ability to successfully perform activities of daily living: Yes, no assistance needed     Fall risk:  Fallen 2 or more times in the past year?: No  Any fall with injury in the past year?: No      Home safety:  none identified      The following health maintenance items are reviewed in Epic and correct as of today:Health Maintenance   Topic Date Due     DEXA Q2 YR  06/12/2017     INFLUENZA VACCINE (SYSTEM ASSIGNED)  09/01/2017     FALL RISK ASSESSMENT  10/06/2017     ADVANCE DIRECTIVE PLANNING Q5 YRS  05/16/2018     MAMMO SCREEN Q2 YR (SYSTEM ASSIGNED)  08/14/2019     TETANUS IMMUNIZATION (SYSTEM ASSIGNED)  05/17/2022     LIPID SCREEN Q5 YR FEMALE (SYSTEM ASSIGNED)  11/07/2022     COLON CANCER SCREEN (SYSTEM ASSIGNED)  04/28/2027     PNEUMOCOCCAL  Completed     HEPATITIS C SCREENING  Completed     Labs reviewed in EPIC  BP Readings from Last 3 Encounters:   11/14/17 116/68   07/10/17 122/70   04/17/17 116/70    Wt Readings from Last 3 Encounters:   11/14/17 92.5 kg (204 lb)   07/10/17 91.2 kg (201 lb)   04/17/17 90.7 kg (200 lb)                          ROS:  C: NEGATIVE for  "fever, chills, change in weight  I: NEGATIVE for worrisome rashes, moles or lesions  E: NEGATIVE for vision changes or irritation  E/M: NEGATIVE for ear, mouth and throat problems  R: NEGATIVE for significant cough or SOB  B: NEGATIVE for masses, tenderness or discharge  CV: NEGATIVE for chest pain, palpitations or peripheral edema  GI: NEGATIVE for nausea, abdominal pain, heartburn, or change in bowel habits  : NEGATIVE for frequency, dysuria, or hematuria  M: NEGATIVE for significant arthralgias or myalgia  N: NEGATIVE for weakness, dizziness or paresthesias  E: NEGATIVE for temperature intolerance, skin/hair changes  H: NEGATIVE for bleeding problems  P: NEGATIVE for changes in mood or affect    OBJECTIVE:   Ht 1.575 m (5' 2\")  Wt 92.5 kg (204 lb)  BMI 37.31 kg/m2   /68  Pulse 56  Resp 20  Ht 1.575 m (5' 2\")  Wt 92.5 kg (204 lb)  LMP   SpO2 97%  BMI 37.31 kg/m2    EXAM:   GENERAL APPEARANCE: healthy, alert, no distress and centrally obese  EYES: Eyes grossly normal to inspection, PERRL and conjunctivae and sclerae normal  HENT: ear canals and TM's normal, nose and mouth without ulcers or lesions, oropharynx clear and oral mucous membranes moist  NECK: no adenopathy, no asymmetry, masses, or scars and thyroid normal to palpation  RESP: lungs clear to auscultation - no rales, rhonchi or wheezes  BREAST: normal without masses, tenderness or nipple discharge and no palpable axillary masses or adenopathy  CV: regular rate and rhythm, normal S1 S2, no S3 or S4, no murmur, click or rub,  peripheral pulses strong  Ext have + 1 edema   ABDOMEN: soft, nontender, no hepatosplenomegaly, no masses and bowel sounds normal  MS: no musculoskeletal defects are noted and gait is age appropriate without ataxia  SKIN: no suspicious lesions or rashes  NEURO: Normal strength and tone, sensory exam grossly normal, mentation intact and speech normal  PSYCH: mentation appears normal and affect normal/bright    ASSESSMENT " "/ PLAN:   Kylee was seen today for physical and hearing screening.    Diagnoses and all orders for this visit:    Medicare annual wellness visit, subsequent  Colon utd, mamm utd, diet and exercise discussed again.    Multiple medical issues    Osteopenia/Menopause  -     DEXA - Hip/Pelvis/Spine (FUTURE/[facility]); Future  Plans pending. Did treat with fosamax 2011 -2016     Coronary artery disease involving native coronary artery of native heart without angina pectoris  -     simvastatin (ZOCOR) 10 MG tablet; Take 1 tablet (10 mg) by mouth At Bedtime  Take asa daily    Hyperlipidemia with target LDL less than 100  Doing well    Gastroesophageal reflux disease, esophagitis presence not specified  raise head of bed , use med      History of stroke  ASA is recommended indefinately    Ulcerative proctitis with other complication (H)  Treating succersfully    Gastroesophageal reflux disease without esophagitis  -     omeprazole (PRILOSEC) 40 MG capsule; TAKE ONE CAPSULE BY MOUTH ONCE DAILY 30 TO 60 MINUTES BEFORE A MEAL    Edema of both legs  Strongly recommend Jobsts. Info given.  Prior skin injury led to prolonged wound       End of Life Planning:  Patient currently has an advanced directive: No.  I have verified the patient's ablity to prepare an advanced directive/make health care decisions.  Literature was provided to assist patient in preparing an advanced directive.    COUNSELING:  Reviewed preventive health counseling, as reflected in patient instructions       Regular exercise       Healthy diet/nutrition       Vision screening       Aspirin Prophylaxsis       Osteoporosis Prevention/Bone Health       Colon cancer screening          Estimated body mass index is 37.31 kg/(m^2) as calculated from the following:    Height as of this encounter: 1.575 m (5' 2\").    Weight as of this encounter: 92.5 kg (204 lb).  Weight management plan: Discussed healthy diet and exercise guidelines and patient will follow up in 12 " months in clinic to re-evaluate.   reports that she quit smoking about 8 years ago. Her smoking use included Cigarettes. She quit after 35.00 years of use. She has never used smokeless tobacco.        Appropriate preventive services were discussed with this patient, including applicable screening as appropriate for cardiovascular disease, diabetes, osteopenia/osteoporosis, and glaucoma.  As appropriate for age/gender, discussed screening for colorectal cancer, prostate cancer, breast cancer, and cervical cancer. Checklist reviewing preventive services available has been given to the patient.    Reviewed patients plan of care and provided an AVS. The Basic Care Plan (routine screening as documented in Health Maintenance) for Kylee meets the Care Plan requirement. This Care Plan has been established and reviewed with the Patient.    >40 min spent with patient, greater than 50% spent on discussion/education/planning - as outlined in assessment and plan      Counseling Resources:  ATP IV Guidelines  Pooled Cohorts Equation Calculator  Breast Cancer Risk Calculator  FRAX Risk Assessment  ICSI Preventive Guidelines  Dietary Guidelines for Americans, 2010  USDA's MyPlate  ASA Prophylaxis  Lung CA Screening    Moe Moody MD  Formerly [facility]

## 2017-11-20 ENCOUNTER — HOSPITAL ENCOUNTER (OUTPATIENT)
Dept: BONE DENSITY | Facility: CLINIC | Age: 67
Discharge: HOME | End: 2017-11-20
Attending: FAMILY MEDICINE | Admitting: FAMILY MEDICINE
Payer: MEDICARE

## 2017-11-20 DIAGNOSIS — Z78.0 MENOPAUSE: ICD-10-CM

## 2017-11-20 PROCEDURE — 77080 DXA BONE DENSITY AXIAL: CPT

## 2018-08-15 ENCOUNTER — TRANSFERRED RECORDS (OUTPATIENT)
Dept: FAMILY MEDICINE | Facility: CLINIC | Age: 68
End: 2018-08-15

## 2018-11-08 DIAGNOSIS — Z79.899 ENCOUNTER FOR LONG-TERM (CURRENT) USE OF MEDICATIONS: ICD-10-CM

## 2018-11-08 DIAGNOSIS — E78.5 HYPERLIPIDEMIA WITH TARGET LDL LESS THAN 100: Primary | ICD-10-CM

## 2018-11-08 PROCEDURE — 80053 COMPREHEN METABOLIC PANEL: CPT | Mod: 90 | Performed by: FAMILY MEDICINE

## 2018-11-08 PROCEDURE — 80061 LIPID PANEL: CPT | Mod: 90 | Performed by: FAMILY MEDICINE

## 2018-11-08 PROCEDURE — 36415 COLL VENOUS BLD VENIPUNCTURE: CPT | Performed by: FAMILY MEDICINE

## 2018-11-08 NOTE — LETTER
[facility] [address] [place]  [address]  Phone: [phone]    November 9, 2018      Kylee RiceStephanie Ville [address] [place] [address]              Dear Kylee,    The results from your recent visit showed that you have Normal glucose, electrolytes, kidney function, and liver function.   Excellent cholesterol. Continue same dose of statin.         Sincerely,     Damari Darden M.D.    Results for orders placed or performed in visit on 11/08/18   Comp. Metabolic Panel (14) ([facility])   Result Value Ref Range    Glucose 84 65 - 99 mg/dL    Urea Nitrogen 15 8 - 27 mg/dL    Creatinine 1.04 (H) 0.57 - 1.00 mg/dL    eGFR If NonAfricn Am 55 (L) >59 mL/min/1.73    eGFR If Africn Am 63 >59 mL/min/1.73    BUN/Creatinine Ratio 14 12 - 28    Sodium 140 134 - 144 mmol/L    Potassium 4.8 3.5 - 5.2 mmol/L    Chloride 101 96 - 106 mmol/L    Total CO2 27 20 - 29 mmol/L    Calcium 9.5 8.7 - 10.3 mg/dL    Protein Total 6.7 6.0 - 8.5 g/dL    Albumin 4.1 3.5 - 4.8 g/dL    Globulin, Total 2.6 1.5 - 4.5 g/dL    A/G Ratio 1.6 1.2 - 2.2    Bilirubin Total 0.7 0.0 - 1.2 mg/dL    Alkaline Phosphatase 66 39 - 117 IU/L    AST 26 0 - 40 IU/L    ALT 7 0 - 32 IU/L    Narrative    Performed at:  01 - [facility] [address], [place], [place]  [id number]  : Victor Manuel Ferris MD, Phone:  [phone]   Lipid Panel ([facility])   Result Value Ref Range    Cholesterol 153 100 - 199 mg/dL    Triglycerides 53 0 - 149 mg/dL    HDL Cholesterol 59 >39 mg/dL    VLDL Cholesterol Vijay 11 5 - 40 mg/dL    LDL Cholesterol Calculated 83 0 - 99 mg/dL    LDL/HDL Ratio 1.4 0.0 - 3.2 ratio    Narrative    Performed at:  01 - [facility] [address], [place], [place]  [id number]  : Victor Manuel Ferris MD, Phone:  [phone]

## 2018-11-09 LAB
ALBUMIN SERPL-MCNC: 4.1 G/DL (ref 3.5–4.8)
ALBUMIN/GLOB SERPL: 1.6 {RATIO} (ref 1.2–2.2)
ALP SERPL-CCNC: 66 IU/L (ref 39–117)
ALT SERPL-CCNC: 7 IU/L (ref 0–32)
AST SERPL-CCNC: 26 IU/L (ref 0–40)
BILIRUB SERPL-MCNC: 0.7 MG/DL (ref 0–1.2)
BUN SERPL-MCNC: 15 MG/DL (ref 8–27)
BUN/CREATININE RATIO: 14 (ref 12–28)
CALCIUM SERPL-MCNC: 9.5 MG/DL (ref 8.7–10.3)
CHLORIDE SERPLBLD-SCNC: 101 MMOL/L (ref 96–106)
CHOLEST SERPL-MCNC: 153 MG/DL (ref 100–199)
CREAT SERPL-MCNC: 1.04 MG/DL (ref 0.57–1)
EGFR IF AFRICN AM: 63 ML/MIN/1.73
EGFR IF NONAFRICN AM: 55 ML/MIN/1.73
GLOBULIN, TOTAL: 2.6 G/DL (ref 1.5–4.5)
GLUCOSE SERPL-MCNC: 84 MG/DL (ref 65–99)
HDLC SERPL-MCNC: 59 MG/DL
LDL/HDL RATIO: 1.4 RATIO (ref 0–3.2)
LDLC SERPL CALC-MCNC: 83 MG/DL (ref 0–99)
POTASSIUM SERPL-SCNC: 4.8 MMOL/L (ref 3.5–5.2)
PROT SERPL-MCNC: 6.7 G/DL (ref 6–8.5)
SODIUM SERPL-SCNC: 140 MMOL/L (ref 134–144)
TOTAL CO2: 27 MMOL/L (ref 20–29)
TRIGL SERPL-MCNC: 53 MG/DL (ref 0–149)
VLDLC SERPL CALC-MCNC: 11 MG/DL (ref 5–40)

## 2018-11-15 ENCOUNTER — OFFICE VISIT (OUTPATIENT)
Dept: FAMILY MEDICINE | Facility: CLINIC | Age: 68
End: 2018-11-15

## 2018-11-15 VITALS
WEIGHT: 206.4 LBS | RESPIRATION RATE: 16 BRPM | HEART RATE: 60 BPM | HEIGHT: 63 IN | DIASTOLIC BLOOD PRESSURE: 72 MMHG | BODY MASS INDEX: 36.57 KG/M2 | SYSTOLIC BLOOD PRESSURE: 104 MMHG

## 2018-11-15 DIAGNOSIS — I25.10 CORONARY ARTERY DISEASE INVOLVING NATIVE CORONARY ARTERY OF NATIVE HEART WITHOUT ANGINA PECTORIS: ICD-10-CM

## 2018-11-15 DIAGNOSIS — K52.9 COLITIS: ICD-10-CM

## 2018-11-15 DIAGNOSIS — Z00.00 MEDICARE ANNUAL WELLNESS VISIT, SUBSEQUENT: Primary | ICD-10-CM

## 2018-11-15 DIAGNOSIS — E66.01 MORBID OBESITY (H): ICD-10-CM

## 2018-11-15 DIAGNOSIS — R10.13 DYSPEPSIA: ICD-10-CM

## 2018-11-15 PROCEDURE — 99214 OFFICE O/P EST MOD 30 MIN: CPT | Mod: 25 | Performed by: FAMILY MEDICINE

## 2018-11-15 PROCEDURE — 99397 PER PM REEVAL EST PAT 65+ YR: CPT | Performed by: FAMILY MEDICINE

## 2018-11-15 RX ORDER — SIMVASTATIN 10 MG
10 TABLET ORAL AT BEDTIME
Qty: 90 TABLET | Refills: 3 | Status: SHIPPED | OUTPATIENT
Start: 2018-11-15 | End: 2019-11-06

## 2018-11-15 NOTE — PROGRESS NOTES
"  SUBJECTIVE:   Kylee Cavazos is a 68 year old female who presents for Preventive Visit.  Itching - med reaction ???  Results of labs: reviewed with patient at today's exam  CHRONIC ISSUES TO ADDRESS TODAY:  1. Hypercholesterolemia: taking simvastatin and tolerating without muscle aches. She tries to \"eat healthy.\"  2. Cough with exercise: episode of coughing after doing a Jonathon class with high intensity exertion. She denies chest pain or heaviness, but after stopping the Jonathon from exertion she had an episode of coughing that lasted that day and has gradually resolved. She felt a bit of gastric acidity that was increased that day. She also has a history of smoking for 40 years at 1/2+ PPD. No known history of Asthma or COPD.  3. GERD: has tried to go without the prilosec 40 mg daily, but notices return of symptoms. She has not tried step down to lower dose.  4. Hx Colitis: New diagnosis in 2016, doing well on Mesalamine, but hasn't been seen by GI in 18 months, even though she was to go back after 6-12. Has vaginal and rectal irritation, possibly due to the Mesalamine.  Are you in the first 12 months of your Medicare Part B coverage?  No    Physical Health:    In general, how would you rate your overall physical health? good    Outside of work, how many days during the week do you exercise? 4-5 days/week    Outside of work, approximately how many minutes a day do you exercise?greater than 60 minutes, variety of activity including HIIT, walking, weights    If you drink alcohol do you typically have >3 drinks per day or >7 drinks per week? No    Do you usually eat at least 4 servings of fruit and vegetables a day, include whole grains & fiber and avoid regularly eating high fat or \"junk\" foods? NO- 1-2 times per day, avoid regularly eating high fat/junk food    Do you have any problems taking medications regularly?  No    Do you have any side effects from medications? itching    Needs assistance for the " following daily activities: no assistance needed    Which of the following safety concerns are present in your home?:  none identified     Hearing impairment: No  2  HEARING FREQUENCY TESTED BOTH EARS:Failed  Right Ear/Left Ear      500 Hz: passed/failed: pass    1000 Hz: Passed   2000 Hz: Passed   4000 Hz: Passed    Jayla Sharma MA 11/15/2018    Please check for ear wax        In the past 6 months, have you been bothered by leaking of urine? sometimes    Mental Health:    In general, how would you rate your overall mental or emotional health? excellent  PHQ-2 Score:      Additional concerns to address?  YES    Fall risk:      Fallen 2 or more times in the past year?: No  Any fall with injury in the past year?: No        COGNITIVE SCREEN  1) Repeat 3 items (Leader, Season, Table)    2) Clock draw: NORMAL  3) 3 item recall: Recalls 3 objects  Results: 3 items recalled: COGNITIVE IMPAIRMENT LESS LIKELY    Mini-CogTM Copyright S Mekhi. Licensed by the author for use in [facility]; reprinted with permission (soob@.[facility]). All rights reserved.            Lab results   Itching - med reaction ???  Ear wax  Cough /phlem after excerzing    Reviewed and updated as needed this visit by clinical staff         Reviewed and updated as needed this visit by Provider        Social History   Substance Use Topics     Smoking status: Former Smoker     Years: 35.00     Types: Cigarettes     Quit date: 5/1/2009     Smokeless tobacco: Never Used     Alcohol use 3.0 oz/week     5 Standard drinks or equivalent per week                             Do you feel safe in your environment - Yes    Do you have a Health Care Directive?: Yes: Advance Directive has been received and scanned.    Current providers sharing in care for this patient include:   Patient Care Team:  Damari Darden MD as PCP - General (Family Practice)    The following health maintenance items are reviewed in Epic and correct as of today:  Health  Maintenance   Topic Date Due     ADVANCE DIRECTIVE PLANNING Q5 YRS  05/16/2018     INFLUENZA VACCINE (1) 09/01/2018     FALL RISK ASSESSMENT  11/14/2018     PHQ-2 Q1 YR  11/14/2018     DEXA Q2 YR  11/20/2019     MAMMO SCREEN Q2 YR (SYSTEM ASSIGNED)  08/15/2020     TETANUS IMMUNIZATION (SYSTEM ASSIGNED)  05/17/2022     LIPID SCREEN Q5 YR FEMALE (SYSTEM ASSIGNED)  11/08/2023     COLON CANCER SCREEN (SYSTEM ASSIGNED)  04/28/2027     PNEUMOCOCCAL  Completed     HEPATITIS C SCREENING  Completed     Labs reviewed in EPIC  BP Readings from Last 3 Encounters:   11/15/18 104/72   11/14/17 116/68   07/10/17 122/70    Wt Readings from Last 3 Encounters:   11/15/18 93.6 kg (206 lb 6.4 oz)   11/14/17 92.5 kg (204 lb)   07/10/17 91.2 kg (201 lb)                  Patient Active Problem List   Diagnosis     GERD (gastroesophageal reflux disease)     Hyperlipidemia with target LDL less than 100     History of stroke     Health Care Home     CAD (coronary artery disease)     Hx of total knee arthroplasty     Low back pain associated with a spinal disorder other than radiculopathy or spinal stenosis     Ulcerative proctitis with other complication (H)     Edema of both legs     Osteopenia, unspecified location     Obesity (BMI 35.0-39.9) with comorbidity (H)     Past Surgical History:   Procedure Laterality Date     ARTHROPLASTY KNEE  7/8/2014    Procedure: ARTHROPLASTY KNEE;  Surgeon: Brian Tobin MD;  Location: [facility] OR     ARTHROPLASTY KNEE Right 9/9/2014    Procedure: ARTHROPLASTY KNEE;  Surgeon: Brian Tobin MD;  Location:  OR     DENTAL SURGERY Left 08/2017    left dental implant     JOINT REPLACEMENT, HIP RT/LT Right     Joint Replacement Hip RT/LT       Social History   Substance Use Topics     Smoking status: Former Smoker     Years: 35.00     Types: Cigarettes     Quit date: 5/1/2009     Smokeless tobacco: Never Used     Alcohol use 3.0 oz/week     5 Standard drinks or equivalent per week     Family History  "  Problem Relation Age of Onset     Diabetes Mother      Hypertension Mother      C.A.D. Father      C.A.D. Brother          Current Outpatient Prescriptions   Medication Sig Dispense Refill     aspirin 325 MG tablet Take 1 tablet (325 mg) by mouth daily 20 tablet 0     Benzocaine-Resorcinol (VAGISIL) 5-2 % CREA Place vaginally as needed       calcium gluconate 500 MG TABS tablet Take 1,500 mg by mouth daily       Cholecalciferol (VITAMIN D3 PO) Take 4,000 Units by mouth daily        Mesalamine 4 GM/60ML SF ENEM See Admin Instructions Uses every 4th  nightly       omeprazole (PRILOSEC) 20 MG CR capsule Take 1 capsule (20 mg) by mouth daily 90 capsule 3     omeprazole (PRILOSEC) 40 MG capsule TAKE ONE CAPSULE BY MOUTH ONCE DAILY 30 TO 60 MINUTES BEFORE A MEAL 90 capsule 3     polyethylene glycol (MIRALAX) powder Take 1 capful by mouth daily.       simvastatin (ZOCOR) 10 MG tablet Take 1 tablet (10 mg) by mouth At Bedtime 90 tablet 3     Carbamide Peroxide (DEBROX OT) Place in ear(s) 2 times daily as needed       No Known Allergies  Recent Labs   Lab Test  11/08/18   1146  11/07/17   0944  09/30/16   1014   07/08/14   0845   LDL  83  88  85   < >   --    HDL  59  56  54   < >   --    TRIG  53  91  87   < >   --    ALT  7  8  11   < >   --    CR  1.04*  0.96  1.05*   < >  0.97   GFRESTIMATED   --    --    --    --   57*   GFRESTBLACK   --    --    --    --   70   POTASSIUM  4.8  5.1  4.7   < >  4.4    < > = values in this interval not displayed.            ROS:  Constitutional, HEENT, cardiovascular, pulmonary, GI, , musculoskeletal, neuro, skin, endocrine and psych systems are negative, except as otherwise noted.    OBJECTIVE:   There were no vitals taken for this visit. Estimated body mass index is 37.31 kg/(m^2) as calculated from the following:    Height as of 11/14/17: 1.575 m (5' 2\").    Weight as of 11/14/17: 92.5 kg (204 lb).  EXAM:   GENERAL APPEARANCE: healthy, alert, no distress and obese  EYES: Eyes " grossly normal to inspection, PERRL and conjunctivae and sclerae normal  HENT: ear canals and TM's normal, nose and mouth without ulcers or lesions, oropharynx clear and oral mucous membranes moist  NECK: no adenopathy, no asymmetry, masses, or scars and thyroid normal to palpation  RESP: lungs clear to auscultation - no rales, rhonchi or wheezes  BREAST: normal without masses, tenderness or nipple discharge and no palpable axillary masses or adenopathy  CV: regular rate and rhythm, normal S1 S2, no S3 or S4, no murmur, click or rub, no peripheral edema and peripheral pulses strong  ABDOMEN: soft, nontender, no hepatosplenomegaly, no masses and bowel sounds normal   (female): normal female external genitalia and perineum without visible irritation.  MS: no musculoskeletal defects are noted and gait is age appropriate without ataxia  SKIN: no suspicious lesions or rashes  NEURO: Normal strength and tone, sensory exam grossly normal, mentation intact and speech normal  PSYCH: mentation appears normal and affect normal/bright        ASSESSMENT / PLAN:   Kylee was seen today for physical.    Diagnoses and all orders for this visit:    Medicare annual wellness visit, subsequent  Patient is a healthy appearing female. Refer to pharmacy for Shingrix, otherwise imms are UTD. BSE reviewed, continue annual mammogram.    Dyspepsia  -     omeprazole (PRILOSEC) 20 MG CR capsule; Take 1 capsule (20 mg) by mouth daily  Given her history of osteopenia, I would rather have her on a lower dose of PPI if tolerated. Decrease omeprazole from 40 mg to 20 mg daily, recommend lifestyle modifications.     Morbid obesity (H)  Discussed associated co-morbidities and recommend healthy eating and increased activity.     Coronary artery disease involving native coronary artery of native heart without angina pectoris  -     simvastatin (ZOCOR) 10 MG tablet; Take 1 tablet (10 mg) by mouth At Bedtime  Recent lipid panel with LDL at goal of <100.  "Continue simvastatin and low saturated fat diet.     Colitis  Referral for management is given for GI. She is resistant to going to a specialist, but the inappropriateness of me treating this problem is discussed. She will need to get her Mesalamine renewal through GI.      End of Life Planning:  Patient currently has an advanced directive: Yes.  Practitioner is supportive of decision.    COUNSELING:  Reviewed preventive health counseling, as reflected in patient instructions       Regular exercise       Healthy diet/nutrition       Vision screening       Hearing screening       Dental care       Osteoporosis Prevention/Bone Health       Colon cancer screening    BP Readings from Last 1 Encounters:   11/14/17 116/68     Estimated body mass index is 37.31 kg/(m^2) as calculated from the following:    Height as of 11/14/17: 1.575 m (5' 2\").    Weight as of 11/14/17: 92.5 kg (204 lb).      Weight management plan: Discussed healthy diet and exercise guidelines and patient will follow up in 12 months in clinic to re-evaluate.     reports that she quit smoking about 9 years ago. Her smoking use included Cigarettes. She quit after 35.00 years of use. She has never used smokeless tobacco.      Appropriate preventive services were discussed with this patient, including applicable screening as appropriate for cardiovascular disease, diabetes, osteopenia/osteoporosis, and glaucoma.  As appropriate for age/gender, discussed screening for colorectal cancer, prostate cancer, breast cancer, and cervical cancer. Checklist reviewing preventive services available has been given to the patient.    Reviewed patients plan of care and provided an AVS. The Basic Care Plan (routine screening as documented in Health Maintenance) for Kylee meets the Care Plan requirement. This Care Plan has been established and reviewed with the Patient.    Counseling Resources:  ATP IV Guidelines  Pooled Cohorts Equation Calculator  Breast Cancer Risk " Calculator  FRAX Risk Assessment  ICSI Preventive Guidelines  Dietary Guidelines for Americans, 2010  USDA's MyPlate  ASA Prophylaxis  Lung CA Screening    Damari Darden MD  [facility]

## 2018-11-15 NOTE — MR AVS SNAPSHOT
After Visit Summary   11/15/2018    Kylee Cavazos    MRN: [id number]           Patient Information     Date Of Birth          1950        Visit Information        Provider Department      11/15/2018 9:30 AM Damari Darden MD [facility]        Today's Diagnoses     Medicare annual wellness visit, subsequent    -  1    Dyspepsia        Morbid obesity (H)        Coronary artery disease involving native coronary artery of native heart without angina pectoris          Care Instructions      Preventive Health Recommendations    See your health care provider every year to    Review health changes.     Discuss preventive care.      Review your medicines if your doctor has prescribed any.      You no longer need a yearly Pap test unless you've had an abnormal Pap test in the past 10 years. If you have vaginal symptoms, such as bleeding or discharge, be sure to talk with your provider about a Pap test.      Every 1 to 2 years, have a mammogram.  If you are over 69, talk with your health care provider about whether or not you want to continue having screening mammograms.      Every 10 years, have a colonoscopy. Or, have a yearly FIT test (stool test). These exams will check for colon cancer.       Have a cholesterol test every 5 years, or more often if your doctor advises it.       Have a diabetes test (fasting glucose) every three years. If you are at risk for diabetes, you should have this test more often.       At age 65, have a bone density scan (DEXA) to check for osteoporosis (brittle bone disease).    Shots:    Get a flu shot each year.    Get a tetanus shot every 10 years.    Talk to your doctor about your pneumonia vaccines. There are now two you should receive - Pneumovax (PPSV 23) and Prevnar (PCV 13).    Talk to your pharmacist about the shingles vaccine.    Talk to your doctor about the hepatitis B vaccine.    Nutrition:     Eat at least 5 servings of fruits and  vegetables each day.      Eat whole-grain bread, whole-wheat pasta and brown rice instead of white grains and rice.      Get adequate Calcium and Vitamin D.     Lifestyle    Exercise at least 150 minutes a week (30 minutes a day, 5 days a week). This will help you control your weight and prevent disease.      Limit alcohol to one drink per day.      No smoking.       Wear sunscreen to prevent skin cancer.       See your dentist twice a year for an exam and cleaning.      See your eye doctor every 1 to 2 years to screen for conditions such as glaucoma, macular degeneration and cataracts.    Personalized Prevention Plan  You are due for the preventive services outlined below.  Your care team is available to assist you in scheduling these services.  If you have already completed any of these items, please share that information with your care team to update in your medical record.  Health Maintenance Due   Topic Date Due     Discuss Advance Directive Planning  05/16/2018     Flu Vaccine (1) 09/01/2018     FALL RISK ASSESSMENT  11/14/2018     Depression Assessment 2 - yearly  11/14/2018             Follow-ups after your visit        Who to contact     If you have questions or need follow up information about today's clinic visit or your schedule please contact [facility] directly at [phone].  Normal or non-critical lab and imaging results will be communicated to you by MyChart, letter or phone within 4 business days after the clinic has received the results. If you do not hear from us within 7 days, please contact the clinic through [facility] or phone. If you have a critical or abnormal lab result, we will notify you by phone as soon as possible.  Submit refill requests through [facility] or call your pharmacy and they will forward the refill request to us. Please allow 3 business days for your refill to be completed.          Additional Information About Your Visit        MyChart Information     Windowfarmst  "lets you send messages to your doctor, view your test results, renew your prescriptions, schedule appointments and more. To sign up, go to [email or web address] . Click on \"Log in\" on the left side of the screen, which will take you to the Welcome page. Then click on \"Sign up Now\" on the right side of the page.     You will be asked to enter the access code listed below, as well as some personal information. Please follow the directions to create your username and password.     Your access code is: [email or web address]  Expires: 2019 10:50 AM     Your access code will  in 90 days. If you need help or a new code, please call your [facility] clinic or [phone].        Care EveryWhere ID     This is your Care EveryWhere ID. This could be used by other organizations to access your [facility] medical records  [id number]        Your Vitals Were     Pulse Respirations Height BMI (Body Mass Index)          60 16 1.6 m (5' 3\") 36.56 kg/m2         Blood Pressure from Last 3 Encounters:   11/15/18 104/72   17 116/68   07/10/17 122/70    Weight from Last 3 Encounters:   11/15/18 93.6 kg (206 lb 6.4 oz)   17 92.5 kg (204 lb)   07/10/17 91.2 kg (201 lb)              Today, you had the following     No orders found for display         Today's Medication Changes          These changes are accurate as of 11/15/18 10:50 AM.  If you have any questions, ask your nurse or doctor.               These medicines have changed or have updated prescriptions.        Dose/Directions    * omeprazole 40 MG capsule   Commonly known as:  priLOSEC   This may have changed:  Another medication with the same name was added. Make sure you understand how and when to take each.   Used for:  Gastroesophageal reflux disease without esophagitis        TAKE ONE CAPSULE BY MOUTH ONCE DAILY 30 TO 60 MINUTES BEFORE A MEAL   Quantity:  90 capsule   Refills:  3       * omeprazole 20 MG CR capsule   Commonly known as:  priLOSEC   This may have " changed:  You were already taking a medication with the same name, and this prescription was added. Make sure you understand how and when to take each.   Used for:  Dyspepsia        Dose:  20 mg   Take 1 capsule (20 mg) by mouth daily   Quantity:  90 capsule   Refills:  3       * Notice:  This list has 2 medication(s) that are the same as other medications prescribed for you. Read the directions carefully, and ask your doctor or other care provider to review them with you.      Stop taking these medicines if you haven't already. Please contact your care team if you have questions.     Calcium-Vitamin D 600-200 MG-UNIT Tabs                Where to get your medicines      These medications were sent to [facility] [address] [place] [address] [address] [place] [address] & [address] [id number]    Hours:  24-hours Phone:  [phone]     omeprazole 20 MG CR capsule    simvastatin 10 MG tablet                Primary Care Provider Office Phone # Fax #    Damari Rema Darden [phone] [address] [place] [address]        Equal Access to Services     JESSICA [facility] AH: Hadii emma ku hadasho Soomaali, waaxda luqadaha, qaybta kaalmada adeegyada, ang cevallos . So [facility] [phone].    ATENCIÓN: Si habla español, tiene a duncan disposición servicios gratuitos de asistencia lingüística. [facility] [phone].    We comply with applicable federal civil rights laws and [place] laws. We do not discriminate on the basis of race, color, national origin, age, disability, sex, sexual orientation, or gender identity.            Thank you!     Thank you for choosing [facility]  for your care. Our goal is always to provide you with excellent care. Hearing back from our patients is one way we can continue to improve our services. Please take a few minutes to complete the written survey that you may receive in the mail after your  visit with us. Thank you!             Your Updated Medication List - Protect others around you: Learn how to safely use, store and throw away your medicines at www.disposemymeds.org.          This list is accurate as of 11/15/18 10:50 AM.  Always use your most recent med list.                   Brand Name Dispense Instructions for use Diagnosis    aspirin 325 MG tablet     20 tablet    Take 1 tablet (325 mg) by mouth daily        calcium gluconate 500 MG Tabs tablet      Take 1,500 mg by mouth daily        DEBROX OT      Place in ear(s) 2 times daily as needed        Mesalamine 4 GM/60ML SF Enem      See Admin Instructions Uses every 4th  nightly    Ulcerative proctitis with other complication (H)       MIRALAX powder   Generic drug:  polyethylene glycol      Take 1 capful by mouth daily.        * omeprazole 40 MG capsule    priLOSEC    90 capsule    TAKE ONE CAPSULE BY MOUTH ONCE DAILY 30 TO 60 MINUTES BEFORE A MEAL    Gastroesophageal reflux disease without esophagitis       * omeprazole 20 MG CR capsule    priLOSEC    90 capsule    Take 1 capsule (20 mg) by mouth daily    Dyspepsia       simvastatin 10 MG tablet    ZOCOR    90 tablet    Take 1 tablet (10 mg) by mouth At Bedtime    Coronary artery disease involving native coronary artery of native heart without angina pectoris       VAGISIL 5-2 % Crea   Generic drug:  Benzocaine-Resorcinol      Place vaginally as needed        VITAMIN D3 PO      Take 4,000 Units by mouth daily        * Notice:  This list has 2 medication(s) that are the same as other medications prescribed for you. Read the directions carefully, and ask your doctor or other care provider to review them with you.

## 2018-11-15 NOTE — PATIENT INSTRUCTIONS

## 2018-12-04 ENCOUNTER — TRANSFERRED RECORDS (OUTPATIENT)
Dept: FAMILY MEDICINE | Facility: CLINIC | Age: 68
End: 2018-12-04

## 2019-07-15 ENCOUNTER — TRANSFERRED RECORDS (OUTPATIENT)
Dept: FAMILY MEDICINE | Facility: CLINIC | Age: 69
End: 2019-07-15

## 2019-10-09 ENCOUNTER — OFFICE VISIT (OUTPATIENT)
Dept: FAMILY MEDICINE | Facility: CLINIC | Age: 69
End: 2019-10-09

## 2019-10-09 VITALS
DIASTOLIC BLOOD PRESSURE: 74 MMHG | HEART RATE: 64 BPM | HEIGHT: 63 IN | BODY MASS INDEX: 36.75 KG/M2 | WEIGHT: 207.4 LBS | RESPIRATION RATE: 16 BRPM | SYSTOLIC BLOOD PRESSURE: 116 MMHG | OXYGEN SATURATION: 98 %

## 2019-10-09 DIAGNOSIS — Z01.818 PREOP GENERAL PHYSICAL EXAM: Primary | ICD-10-CM

## 2019-10-09 PROCEDURE — 99214 OFFICE O/P EST MOD 30 MIN: CPT | Performed by: FAMILY MEDICINE

## 2019-10-09 ASSESSMENT — MIFFLIN-ST. JEOR: SCORE: 1416.95

## 2019-10-09 NOTE — PROGRESS NOTES
[facility]  [address] [place] [id number] [phone]  Dept: [phone]    PRE-OP EVALUATION:  Today's date: 10/9/2019    Kylee Cavazos (: 1950) presents for pre-operative evaluation assessment as requested by Dr. Street.  She requires evaluation and anesthesia risk assessment prior to undergoing surgery/procedure for treatment of cataracts .    Proposed Surgery/ Procedure: Cataract Removal, Left then Right  Date of Surgery/ Procedure: 10/15/19 & 10/29/19  Time of Surgery/ Procedure: 30 & 10  Hospital/Surgical Facility: [facility]  Fax number for surgical facility: [phone]  Primary Physician: Damari Darden  Type of Anesthesia Anticipated: to be determined    Patient has a Health Care Directive or Living Will:  YES     1. YES - DO YOU HAVE A HISTORY OF HEART ATTACK, STROKE, STENT, BYPASS OR SURGERY ON AN ARTERY IN THE HEAD, NECK, HEART OR LEG? Aliza, 2009, stable since.   2. NO - Do you ever have any pain or discomfort in your chest?  3. NO - Do you have a history of  Heart Failure?  4. YES - ARE YOUR TROUBLED BY SHORTNESS OF BREATH WHEN WALKING ON THE LEVEL, UP A SLIGHT HILL OR AT NIGHT? SOB when walking up hills, she is able to use exercise bike without any issues of dyspnea or chest pain  5. NO - Do you currently have a cold, bronchitis or other respiratory infection?  6. NO - Do you have a cough, shortness of breath or wheezing?  7. NO - Do you sometimes get pains in the calves of your legs when you walk?  8. NO - Do you or anyone in your family have previous history of blood clots?  9. NO - Do you or does anyone in your family have a serious bleeding problem such as prolonged bleeding following surgeries or cuts?  10. NO - Have you ever had problems with anemia or been told to take iron pills?  11. YES - HAVE YOU HAD ANY ABNORMAL BLOOD LOSS SUCH AS BLACK, TARRY OR BLOODY STOOLS, OR ABNORMAL VAGINAL BLEEDING? Colitis - on  medication  12. NO - Have you ever had a blood transfusion?  13. NO - Have you or any of your relatives ever had problems with anesthesia?  14. NO - Do you have sleep apnea, excessive snoring or daytime drowsiness?  15. NO - Do you have any prosthetic heart valves?  16. YES - DO YOU HAVE PROSTHETIC JOINTS? Both knees, Right hip  17. NO - Is there any chance that you may be pregnant?      HPI:     HPI related to upcoming procedure: Vision has been gradually worsening over a few years      HYPERLIPIDEMIA - Patient has a long history of significant Hyperlipidemia requiring medication for treatment with recent good control. Patient reports no problems or side effects with the medication.     Ulcerative Colitis: followed by Dr. Brewster from [facility]. Stable on current medications.     MEDICAL HISTORY:     Patient Active Problem List    Diagnosis Date Noted     Obesity (BMI 35.0-39.9) with comorbidity (H) 11/15/2018     Priority: Medium     Edema of both legs 11/14/2017     Priority: Medium     Osteopenia, unspecified location 11/14/2017     Priority: Medium     Ulcerative proctitis with other complication (H) 06/23/2017     Priority: Medium     Low back pain associated with a spinal disorder other than radiculopathy or spinal stenosis 10/21/2016     Priority: Medium     Hx of total knee arthroplasty 07/08/2014     Priority: Medium     CAD (coronary artery disease)      Priority: Medium     based on CT angio multiple vessels       Health Care Home 05/29/2014     Priority: Medium     State Tier Level:  Tier 1           History of stroke 05/17/2012     Priority: Medium     GERD (gastroesophageal reflux disease) 05/19/2011     Priority: Medium     Hyperlipidemia with target LDL less than 100 05/19/2011     Priority: Medium     Diagnosis updated by automated process. Provider to review and confirm.        Past Medical History:   Diagnosis Date     CAD (coronary artery disease)     based on CT angio multiple vessels     Collagenous  colitis 2010    Documented on colonoscopy with biopsy     Ex-smoker      Gastro-oesophageal reflux disease      History of stroke 2009    L MCA , had embolectomy and Tpa     Hyperlipidemia LDL goal < 100      OA (osteoarthritis) of knee     ref  to [facility] 10/11// Dr MARK Tobin in 2014     Osteopenia     fosamax started 2011- will use until 2016     PFO (patent foramen ovale)     no treatment recommended, but pt could choose to close     Ulcerative colitis with rectal bleeding (H) 2017    biopsy at colonoscopy - consistent with Ulcerative colitis. treated with pred and suggested Rowasa enemas, if not helpful may need immunosupression.     Unspecified cerebral artery occlusion with cerebral infarction     2009     Past Surgical History:   Procedure Laterality Date     ARTHROPLASTY KNEE  7/8/2014    Procedure: ARTHROPLASTY KNEE;  Surgeon: Brian Tobin MD;  Location:  OR     ARTHROPLASTY KNEE Right 9/9/2014    Procedure: ARTHROPLASTY KNEE;  Surgeon: Brian Tobin MD;  Location:  OR     DENTAL SURGERY Left 08/2017    left dental implant     JOINT REPLACEMENT, HIP RT/LT Right     Joint Replacement Hip RT/LT     Current Outpatient Medications   Medication Sig Dispense Refill     aspirin 325 MG tablet Take 1 tablet (325 mg) by mouth daily 20 tablet 0     Benzocaine-Resorcinol (VAGISIL) 5-2 % CREA Place vaginally as needed       calcium gluconate 500 MG TABS tablet Take 1,500 mg by mouth daily       Carbamide Peroxide (DEBROX OT) Place in ear(s) 2 times daily as needed       Cholecalciferol (VITAMIN D3 PO) Take 4,000 Units by mouth daily        Mesalamine 4 GM/60ML SF ENEM See Admin Instructions Uses every 4th  nightly       omeprazole (PRILOSEC) 20 MG CR capsule Take 1 capsule (20 mg) by mouth daily 90 capsule 3     omeprazole (PRILOSEC) 40 MG capsule TAKE ONE CAPSULE BY MOUTH ONCE DAILY 30 TO 60 MINUTES BEFORE A MEAL 90 capsule 3     polyethylene glycol (MIRALAX) powder Take 1 capful by mouth daily.        simvastatin (ZOCOR) 10 MG tablet Take 1 tablet (10 mg) by mouth At Bedtime 90 tablet 3     OTC products: None, except as noted above    No Known Allergies   Latex Allergy: NO    Social History     Tobacco Use     Smoking status: Former Smoker     Years: 35.00     Types: Cigarettes     Last attempt to quit: 5/1/2009     Years since quitting: 10.4     Smokeless tobacco: Never Used   Substance Use Topics     Alcohol use: Yes     Alcohol/week: 5.0 standard drinks     Types: 5 Standard drinks or equivalent per week     History   Drug Use No       REVIEW OF SYSTEMS:   Constitutional, neuro, ENT, endocrine, pulmonary, cardiac, gastrointestinal, genitourinary, musculoskeletal, integument and psychiatric systems are negative, except as otherwise noted.    EXAM:   There were no vitals taken for this visit.    GENERAL APPEARANCE: healthy, alert and no distress     EYES: EOMI, PERRL     HENT: ear canals and TM's normal and nose and mouth without ulcers or lesions     NECK: no adenopathy, no asymmetry, masses, or scars and thyroid normal to palpation     RESP: lungs clear to auscultation - no rales, rhonchi or wheezes     CV: regular rates and rhythm, normal S1 S2, no S3 or S4 and no murmur, click or rub     ABDOMEN:  soft, nontender, no HSM or masses and bowel sounds normal     MS: extremities normal- no gross deformities noted, no evidence of inflammation in joints, FROM in all extremities.     SKIN: no suspicious lesions or rashes     NEURO: Normal strength and tone, sensory exam grossly normal, mentation intact and speech normal     PSYCH: mentation appears normal. and affect normal/bright     LYMPHATICS: No cervical adenopathy    DIAGNOSTICS:   No labs or EKG required for low risk surgery (cataract, skin procedure, breast biopsy, etc)    Recent Labs   Lab Test 11/08/18  1146 11/07/17  0944 04/17/17  1008  07/08/15  1059   HGB  --   --  12.1  --  12.8   PLT  --   --  284  --  287    141  --    < >  --     POTASSIUM 4.8 5.1  --    < >  --    CR 1.04* 0.96  --    < >  --     < > = values in this interval not displayed.        IMPRESSION:   Reason for surgery/procedure: poor vision due to cataracts  Diagnosis/reason for consult: preoperative clearance    The proposed surgical procedure is considered LOW risk.    REVISED CARDIAC RISK INDEX  The patient has the following serious cardiovascular risks for perioperative complications such as (MI, PE, VFib and 3  AV Block):  Cerebrovascular Disease (TIA or CVA)  INTERPRETATION: 1 risks: Class II (low risk - 0.9% complication rate)    The patient has the following additional risks for perioperative complications:  No identified additional risks      ICD-10-CM    1. Preop general physical exam Z01.818        RECOMMENDATIONS:             APPROVAL GIVEN to proceed with proposed procedure, without further diagnostic evaluation       Signed Electronically by: Damari Darden MD    Copy of this evaluation report is provided to requesting physician.    [facility] Preop Guidelines    Revised Cardiac Risk Index

## 2019-10-10 NOTE — PROGRESS NOTES
10/1/19faxed preop to [facility] @ [phone]    Luis Manuel Fritz,   [facility]  [phone]

## 2019-11-04 DIAGNOSIS — R10.13 DYSPEPSIA: ICD-10-CM

## 2019-11-06 DIAGNOSIS — I25.10 CORONARY ARTERY DISEASE INVOLVING NATIVE CORONARY ARTERY OF NATIVE HEART WITHOUT ANGINA PECTORIS: ICD-10-CM

## 2019-11-06 RX ORDER — SIMVASTATIN 10 MG
10 TABLET ORAL AT BEDTIME
Qty: 90 TABLET | Refills: 3 | Status: SHIPPED | OUTPATIENT
Start: 2019-11-06 | End: 2019-11-18

## 2019-11-11 DIAGNOSIS — K52.9 COLITIS: ICD-10-CM

## 2019-11-11 DIAGNOSIS — E78.5 HYPERLIPIDEMIA WITH TARGET LDL LESS THAN 100: ICD-10-CM

## 2019-11-11 DIAGNOSIS — I25.10 CORONARY ARTERY DISEASE INVOLVING NATIVE CORONARY ARTERY OF NATIVE HEART WITHOUT ANGINA PECTORIS: Primary | ICD-10-CM

## 2019-11-11 DIAGNOSIS — R10.13 DYSPEPSIA: ICD-10-CM

## 2019-11-11 LAB
% GRANULOCYTES: 57.2 % (ref 42.2–75.2)
HCT VFR BLD AUTO: 37.7 % (ref 35–46)
HEMOGLOBIN: 11.8 G/DL (ref 11.8–15.5)
LYMPHOCYTES NFR BLD AUTO: 33.8 % (ref 20.5–51.1)
MCH RBC QN AUTO: 26.2 PG (ref 27–31)
MCHC RBC AUTO-ENTMCNC: 31.4 G/DL (ref 33–37)
MCV RBC AUTO: 83.5 FL (ref 80–100)
MONOCYTES NFR BLD AUTO: 9 % (ref 1.7–9.3)
PLATELET # BLD AUTO: 333 K/UL (ref 140–450)
RBC # BLD AUTO: 4.51 X10/CMM (ref 3.7–5.2)
WBC # BLD AUTO: 4.5 X10/CMM (ref 3.8–11)

## 2019-11-11 PROCEDURE — 36415 COLL VENOUS BLD VENIPUNCTURE: CPT | Performed by: FAMILY MEDICINE

## 2019-11-11 PROCEDURE — 85025 COMPLETE CBC W/AUTO DIFF WBC: CPT | Performed by: FAMILY MEDICINE

## 2019-11-11 NOTE — LETTER
[facility] [address] [place]  [address]  Phone: [phone]    November 13, 2019      [place] [address] [place] [address]              Dear Kylee,    The results from your recent visit showed Normal blood counts.   Excellent cholesterol.   Slightly decreased kidney function but otherwise normal glucose, electrolytes, and liver function.        Sincerely,     Damari Darden M.D.    Results for orders placed or performed in visit on 11/11/19   Comp. Metabolic Panel (14) ([facility])     Status: Abnormal   Result Value Ref Range    Glucose 90 65 - 99 mg/dL    Urea Nitrogen 11 8 - 27 mg/dL    Creatinine 1.02 (H) 0.57 - 1.00 mg/dL    eGFR If NonAfricn Am 55 (L) >59 mL/min/1.73    eGFR If Africn Am 64 >59 mL/min/1.73    BUN/Creatinine Ratio 11 (L) 12 - 28    Sodium 141 134 - 144 mmol/L    Potassium 4.4 3.5 - 5.2 mmol/L    Chloride 104 96 - 106 mmol/L    Total CO2 26 20 - 29 mmol/L    Calcium 9.1 8.7 - 10.3 mg/dL    Protein Total 6.6 6.0 - 8.5 g/dL    Albumin 4.0 3.5 - 4.8 g/dL    Globulin, Total 2.6 1.5 - 4.5 g/dL    A/G Ratio 1.5 1.2 - 2.2    Bilirubin Total 0.5 0.0 - 1.2 mg/dL    Alkaline Phosphatase 72 39 - 117 IU/L    AST 22 0 - 40 IU/L    ALT 7 0 - 32 IU/L   Lipid Panel ([facility])     Status: None   Result Value Ref Range    Cholesterol 161 100 - 199 mg/dL    Triglycerides 77 0 - 149 mg/dL    HDL Cholesterol 56 >39 mg/dL    VLDL Cholesterol Vijay 15 5 - 40 mg/dL    LDL Cholesterol Calculated 90 0 - 99 mg/dL    LDL/HDL Ratio 1.6 0.0 - 3.2 ratio   CBC with Diff/Plt ([facility])     Status: Abnormal   Result Value Ref Range    WBC x10/cmm 4.5 3.8 - 11.0 x10/cmm    % Lymphocytes 33.8 20.5 - 51.1 %    % Monocytes 9.0 1.7 - 9.3 %    % Granulocytes 57.2 42.2 - 75.2 %    RBC x10/cmm 4.51 3.7 - 5.2 x10/cmm    Hemoglobin 11.8 11.8 - 15.5 g/dl    Hematocrit 37.7 35 - 46 %    MCV 83.5 80 - 100 fL    MCH 26.2 (A) 27.0 - 31.0 pg    MCHC 31.4 (A) 33.0 - 37.0 g/dL    Platelet Count  333 140 - 450 K/uL

## 2019-11-11 NOTE — PROGRESS NOTES
fasting 14 hrs Darden pre cpx 11/18- comp, lipid,cbc  Jayla Sharma MA November 11, 2019 10:06 AM

## 2019-11-12 LAB
ALBUMIN SERPL-MCNC: 4 G/DL (ref 3.5–4.8)
ALBUMIN/GLOB SERPL: 1.5 {RATIO} (ref 1.2–2.2)
ALP SERPL-CCNC: 72 IU/L (ref 39–117)
ALT SERPL-CCNC: 7 IU/L (ref 0–32)
AST SERPL-CCNC: 22 IU/L (ref 0–40)
BILIRUB SERPL-MCNC: 0.5 MG/DL (ref 0–1.2)
BUN SERPL-MCNC: 11 MG/DL (ref 8–27)
BUN/CREATININE RATIO: 11 (ref 12–28)
CALCIUM SERPL-MCNC: 9.1 MG/DL (ref 8.7–10.3)
CHLORIDE SERPLBLD-SCNC: 104 MMOL/L (ref 96–106)
CHOLEST SERPL-MCNC: 161 MG/DL (ref 100–199)
CREAT SERPL-MCNC: 1.02 MG/DL (ref 0.57–1)
EGFR IF AFRICN AM: 64 ML/MIN/1.73
EGFR IF NONAFRICN AM: 55 ML/MIN/1.73
GLOBULIN, TOTAL: 2.6 G/DL (ref 1.5–4.5)
GLUCOSE SERPL-MCNC: 90 MG/DL (ref 65–99)
HDLC SERPL-MCNC: 56 MG/DL
LDL/HDL RATIO: 1.6 RATIO (ref 0–3.2)
LDLC SERPL CALC-MCNC: 90 MG/DL (ref 0–99)
POTASSIUM SERPL-SCNC: 4.4 MMOL/L (ref 3.5–5.2)
PROT SERPL-MCNC: 6.6 G/DL (ref 6–8.5)
SODIUM SERPL-SCNC: 141 MMOL/L (ref 134–144)
TOTAL CO2: 26 MMOL/L (ref 20–29)
TRIGL SERPL-MCNC: 77 MG/DL (ref 0–149)
VLDLC SERPL CALC-MCNC: 15 MG/DL (ref 5–40)

## 2019-11-18 ENCOUNTER — OFFICE VISIT (OUTPATIENT)
Dept: FAMILY MEDICINE | Facility: CLINIC | Age: 69
End: 2019-11-18

## 2019-11-18 VITALS
HEIGHT: 63 IN | SYSTOLIC BLOOD PRESSURE: 118 MMHG | BODY MASS INDEX: 36.68 KG/M2 | DIASTOLIC BLOOD PRESSURE: 62 MMHG | HEART RATE: 58 BPM | OXYGEN SATURATION: 99 % | WEIGHT: 207 LBS

## 2019-11-18 DIAGNOSIS — M85.80 OSTEOPENIA, UNSPECIFIED LOCATION: ICD-10-CM

## 2019-11-18 DIAGNOSIS — Z00.00 ENCOUNTER FOR MEDICARE ANNUAL WELLNESS EXAM: Primary | ICD-10-CM

## 2019-11-18 DIAGNOSIS — K51.218 ULCERATIVE PROCTITIS WITH OTHER COMPLICATION (H): ICD-10-CM

## 2019-11-18 DIAGNOSIS — E78.5 HYPERLIPIDEMIA WITH TARGET LDL LESS THAN 100: ICD-10-CM

## 2019-11-18 DIAGNOSIS — E66.01 MORBID OBESITY (H): ICD-10-CM

## 2019-11-18 PROCEDURE — G0439 PPPS, SUBSEQ VISIT: HCPCS | Performed by: FAMILY MEDICINE

## 2019-11-18 PROCEDURE — 36415 COLL VENOUS BLD VENIPUNCTURE: CPT | Performed by: FAMILY MEDICINE

## 2019-11-18 PROCEDURE — 99214 OFFICE O/P EST MOD 30 MIN: CPT | Mod: 25 | Performed by: FAMILY MEDICINE

## 2019-11-18 RX ORDER — SIMVASTATIN 10 MG
10 TABLET ORAL AT BEDTIME
Qty: 90 TABLET | Refills: 3 | Status: SHIPPED | OUTPATIENT
Start: 2019-11-18 | End: 2020-10-25

## 2019-11-18 ASSESSMENT — MIFFLIN-ST. JEOR: SCORE: 1415.14

## 2019-11-18 NOTE — PROGRESS NOTES
"  SUBJECTIVE:   Kylee Cavazos is a 69 year old female who presents for Preventive Visit.  ISSUES TO ADDRESS TODAY:  1. Hypercholesterolemia: taking statin and tolerating well without muscle aches. Fasting labs last week showed good control.   2. Osteopenia: worse in left hip, right hip has been replaced. She takes vitamin D supplement. She takes calcium supplement.   3. Dyspepsia: does well on omeprazole.  4. Inflammatory Bowel Disease: followed by GI, reasonably stable on current medications.    HEARING FREQUENCY:     Right Ear:     500 Hz : Pass   1000 Hz: Pass   2000 Hz: Pass   4000 Hz: Pass  Left Ear:     500 Hz :  Pass   1000 Hz: Pass   2000 Hz: Pass   4000 Hz: Pass    Liliana Bishop MA  November 18, 2019    Are you in the first 12 months of your Medicare Part B coverage?  No    Physical Health:    In general, how would you rate your overall physical health? good    Outside of work, how many days during the week do you exercise? 2-3 days/week    Outside of work, approximately how many minutes a day do you exercise?30-45 minutes    If you drink alcohol do you typically have >3 drinks per day or >7 drinks per week?     Do you usually eat at least 4 servings of fruit and vegetables a day, include whole grains & fiber and avoid regularly eating high fat or \"junk\" foods? Yes    Do you have any problems taking medications regularly?  No    Do you have any side effects from medications? none    Needs assistance for the following daily activities: no assistance needed    Which of the following safety concerns are present in your home?  none identified     Hearing impairment: No    In the past 6 months, have you been bothered by leaking of urine? no    Mental Health:    In general, how would you rate your overall mental or emotional health? excellent  PHQ-2 Score:      Do you feel safe in your environment? Yes    Have you ever done Advance Care Planning? (For example, a Health Directive, POLST, or a " discussion with a medical provider or your loved ones about your wishes): Yes, advance care planning is on file.    Additional concerns to address?  No    Fall risk:  Fallen 2 or more times in the past year?: No  Any fall with injury in the past year?: No    Cognitive Screenin) Repeat 3 items (Leader, Season, Table)    2) Clock draw: NORMAL  3) 3 item recall: Recalls 3 objects  Results: 3 items recalled: COGNITIVE IMPAIRMENT LESS LIKELY    Mini-CogTM Copyright [employer]. Licensed by the author for use in [facility]; reprinted with permission (soob@[facility]). All rights reserved.      Do you have sleep apnea, excessive snoring or daytime drowsiness?: no    Reviewed and updated as needed this visit by clinical staff  Tobacco  Allergies  Meds       Reviewed and updated as needed this visit by Provider        Social History     Tobacco Use     Smoking status: Former Smoker     Packs/day: 0.50     Years: 35.00     Pack years: 17.50     Types: Cigarettes     Last attempt to quit: 2009     Years since quitting: 10.5     Smokeless tobacco: Never Used   Substance Use Topics     Alcohol use: Yes     Alcohol/week: 5.0 standard drinks     Types: 5 Standard drinks or equivalent per week                           Current providers sharing in care for this patient include:   Patient Care Team:  Damari Darden MD as PCP - General (Family Practice)  Damari Darden MD as Assigned PCP    The following health maintenance items are reviewed in Epic and correct as of today:  Health Maintenance   Topic Date Due     DEXA  2019     MEDICARE ANNUAL WELLNESS VISIT  11/15/2019     FALL RISK ASSESSMENT  2020     MAMMO SCREENING  2021     DTAP/TDAP/TD IMMUNIZATION (3 - Td) 2022     LIPID  2024     ADVANCE CARE PLANNING  2024     COLONOSCOPY  2027     HEPATITIS C SCREENING  Completed     PHQ-2  Completed     INFLUENZA VACCINE  Completed     PNEUMOCOCCAL IMMUNIZATION  65+ LOW/MEDIUM RISK  Completed     ZOSTER IMMUNIZATION  Completed     IPV IMMUNIZATION  Aged Out     MENINGITIS IMMUNIZATION  Aged Out     Lab work is in process  Labs reviewed in EPIC  BP Readings from Last 3 Encounters:   11/18/19 118/62   10/09/19 116/74   11/15/18 104/72    Wt Readings from Last 3 Encounters:   11/18/19 93.9 kg (207 lb)   10/09/19 94.1 kg (207 lb 6.4 oz)   11/15/18 93.6 kg (206 lb 6.4 oz)                  Patient Active Problem List   Diagnosis     GERD (gastroesophageal reflux disease)     Hyperlipidemia with target LDL less than 100     History of stroke     Health Care Home     Hx of total knee arthroplasty     Low back pain associated with a spinal disorder other than radiculopathy or spinal stenosis     Ulcerative proctitis with other complication (H)     Edema of both legs     Osteopenia, unspecified location     Obesity (BMI 35.0-39.9) with comorbidity (H)     Past Surgical History:   Procedure Laterality Date     ARTHROPLASTY KNEE  7/8/2014    Procedure: ARTHROPLASTY KNEE;  Surgeon: Brian Tobin MD;  Location:  OR     ARTHROPLASTY KNEE Right 9/9/2014    Procedure: ARTHROPLASTY KNEE;  Surgeon: Brian Tobin MD;  Location:  OR     DENTAL SURGERY Left 08/2017    left dental implant     JOINT REPLACEMENT, HIP RT/LT Right     Joint Replacement Hip RT/LT       Social History     Tobacco Use     Smoking status: Former Smoker     Packs/day: 0.50     Years: 35.00     Pack years: 17.50     Types: Cigarettes     Last attempt to quit: 5/1/2009     Years since quitting: 10.5     Smokeless tobacco: Never Used   Substance Use Topics     Alcohol use: Yes     Alcohol/week: 5.0 standard drinks     Types: 5 Standard drinks or equivalent per week     Family History   Problem Relation Age of Onset     Diabetes Mother      Hypertension Mother      C.A.D. Father      C.A.D. Brother          Current Outpatient Medications   Medication Sig Dispense Refill     aspirin 325 MG tablet Take 1 tablet  "(325 mg) by mouth daily 20 tablet 0     calcium gluconate 500 MG TABS tablet Take 1,500 mg by mouth daily       Cholecalciferol (VITAMIN D3 PO) Take 4,000 Units by mouth daily        Mesalamine 4 GM/60ML SF ENEM See Admin Instructions Uses every 4th  nightly       omeprazole (PRILOSEC) 20 MG DR capsule Take 1 capsule (20 mg) by mouth daily 90 capsule 3     simvastatin (ZOCOR) 10 MG tablet Take 1 tablet (10 mg) by mouth At Bedtime 90 tablet 3     polyethylene glycol (MIRALAX) powder Take 1 capful by mouth daily.       No Known Allergies  Recent Labs   Lab Test 11/11/19  1015 11/08/18  1146 11/07/17  0944  07/08/14  0845   LDL 90 83 88   < >  --    HDL 56 59 56   < >  --    TRIG 77 53 91   < >  --    ALT 7 7 8   < >  --    CR 1.02* 1.04* 0.96   < > 0.97   GFRESTIMATED  --   --   --   --  57*   GFRESTBLACK  --   --   --   --  70   POTASSIUM 4.4 4.8 5.1   < > 4.4    < > = values in this interval not displayed.        ROS:  Constitutional, HEENT, cardiovascular, pulmonary, GI, , musculoskeletal, neuro, skin, endocrine and psych systems are negative, except as otherwise noted.    OBJECTIVE:   /62 (BP Location: Right arm, Patient Position: Sitting, Cuff Size: Adult Regular)   Pulse 58   Ht 1.588 m (5' 2.5\")   Wt 93.9 kg (207 lb)   SpO2 99%   BMI 37.26 kg/m   Estimated body mass index is 37.26 kg/m  as calculated from the following:    Height as of this encounter: 1.588 m (5' 2.5\").    Weight as of this encounter: 93.9 kg (207 lb).  EXAM:   GENERAL APPEARANCE: healthy, alert and no distress  EYES: Eyes grossly normal to inspection, PERRL and conjunctivae and sclerae normal  HENT: ear canals and TM's normal, nose and mouth without ulcers or lesions, oropharynx clear and oral mucous membranes moist  NECK: no adenopathy, no asymmetry, masses, or scars and thyroid normal to palpation  RESP: lungs clear to auscultation - no rales, rhonchi or wheezes  BREAST: normal without masses, tenderness or nipple discharge and " "no palpable axillary masses or adenopathy  CV: regular rate and rhythm, normal S1 S2, no S3 or S4, no murmur, click or rub, no peripheral edema and peripheral pulses strong  ABDOMEN: soft, nontender, no hepatosplenomegaly, no masses and bowel sounds normal  MS: no musculoskeletal defects are noted and gait is age appropriate without ataxia  SKIN: no suspicious lesions or rashes  NEURO: Normal strength and tone, sensory exam grossly normal, mentation intact and speech normal  PSYCH: mentation appears normal and affect normal/bright        ASSESSMENT / PLAN:   Kylee was seen today for physical.    Diagnoses and all orders for this visit:    Encounter for Medicare annual wellness exam  Healthy appearing 69 year old female. Imms are entirely UTD, BSE reviewed, mammo to continue annually.    Hyperlipidemia with target LDL less than 100  -     simvastatin (ZOCOR) 10 MG tablet; Take 1 tablet (10 mg) by mouth At Bedtime  -     VENOUS COLLECTION  Verify good control. Continue simvastatin.    Osteopenia, unspecified location  -     Vitamin D  25-Hydroxy ([facility])  -     DEXA - Hip/Pelvis/Spine (FUTURE/[facility]); Future  Need to verify stability. Continue vitamin D and dietary calcium, along with weight bearing activities.    Ulcerative proctitis with other complication (H)  Care per her GI specialist.    Morbid obesity (H)  I am concerned about per progressively increasing weight. She is offered referral to bariatric medicine but declines.      COUNSELING:  Reviewed preventive health counseling, as reflected in patient instructions       Regular exercise       Healthy diet/nutrition       Vision screening       Hearing screening       Dental care       Bladder control       Osteoporosis Prevention/Bone Health       Colon cancer screening    Estimated body mass index is 37.26 kg/m  as calculated from the following:    Height as of this encounter: 1.588 m (5' 2.5\").    Weight as of this encounter: 93.9 kg (207 " lb).    Weight management plan: Discussed healthy diet and exercise guidelines     reports that she quit smoking about 10 years ago. Her smoking use included cigarettes. She has a 17.50 pack-year smoking history. She has never used smokeless tobacco.      Appropriate preventive services were discussed with this patient, including applicable screening as appropriate for cardiovascular disease, diabetes, osteopenia/osteoporosis, and glaucoma.  As appropriate for age/gender, discussed screening for colorectal cancer, prostate cancer, breast cancer, and cervical cancer. Checklist reviewing preventive services available has been given to the patient.    Reviewed patients plan of care and provided an AVS. The Basic Care Plan (routine screening as documented in Health Maintenance) for Kylee meets the Care Plan requirement. This Care Plan has been established and reviewed with the Patient.    Counseling Resources:  ATP IV Guidelines  Pooled Cohorts Equation Calculator  Breast Cancer Risk Calculator  FRAX Risk Assessment  ICSI Preventive Guidelines  Dietary Guidelines for Americans, 2010  USDA's MyPlate  ASA Prophylaxis  Lung CA Screening    Damari Darden MD  [facility]

## 2019-11-18 NOTE — LETTER
[facility] [address] [place]  [address]  Phone: [phone]    November 20, 2019      Kylee [place] [id number] [place] [address]              Dear Kylee,    The results from your recent visit showed Low normal vitamin D level. I recommend increasing supplement by 2,000 IU daily.       Sincerely,     Damari Darden M.D.    Results for orders placed or performed in visit on 11/18/19   Vitamin D  25-Hydroxy ([facility])     Status: None   Result Value Ref Range    Vitamin D,25-Hydroxy 39.7 30.0 - 100.0 ng/mL

## 2019-11-19 LAB — VITAMIN D, 25-HYDROXY: 39.7 NG/ML (ref 30–100)

## 2019-12-04 ENCOUNTER — HOSPITAL ENCOUNTER (OUTPATIENT)
Dept: BONE DENSITY | Facility: CLINIC | Age: 69
Discharge: HOME | End: 2019-12-04
Attending: FAMILY MEDICINE | Admitting: FAMILY MEDICINE
Payer: MEDICARE

## 2019-12-04 DIAGNOSIS — M85.80 OSTEOPENIA, UNSPECIFIED LOCATION: ICD-10-CM

## 2019-12-04 PROCEDURE — 77080 DXA BONE DENSITY AXIAL: CPT

## 2020-01-07 ENCOUNTER — TRANSFERRED RECORDS (OUTPATIENT)
Dept: FAMILY MEDICINE | Facility: CLINIC | Age: 70
End: 2020-01-07

## 2020-07-27 ENCOUNTER — TRANSFERRED RECORDS (OUTPATIENT)
Dept: FAMILY MEDICINE | Facility: CLINIC | Age: 70
End: 2020-07-27

## 2020-08-20 ENCOUNTER — TRANSFERRED RECORDS (OUTPATIENT)
Dept: FAMILY MEDICINE | Facility: CLINIC | Age: 70
End: 2020-08-20

## 2020-08-25 NOTE — PROGRESS NOTES
This report was scanned into the wrong patient chart.    Luis Manuel Fritz,   [facility]  [phone]

## 2020-08-27 ENCOUNTER — TRANSFERRED RECORDS (OUTPATIENT)
Dept: FAMILY MEDICINE | Facility: CLINIC | Age: 70
End: 2020-08-27

## 2020-10-24 DIAGNOSIS — R10.13 DYSPEPSIA: ICD-10-CM

## 2020-10-25 DIAGNOSIS — E78.5 HYPERLIPIDEMIA WITH TARGET LDL LESS THAN 100: ICD-10-CM

## 2020-10-25 RX ORDER — SIMVASTATIN 10 MG
TABLET ORAL
Qty: 90 TABLET | Refills: 3 | Status: SHIPPED | OUTPATIENT
Start: 2020-10-25 | End: 2020-11-25

## 2020-11-18 VITALS — TEMPERATURE: 97.9 F

## 2020-11-18 DIAGNOSIS — E78.5 HYPERLIPIDEMIA WITH TARGET LDL LESS THAN 100: Primary | ICD-10-CM

## 2020-11-18 DIAGNOSIS — K52.9 COLITIS: ICD-10-CM

## 2020-11-18 DIAGNOSIS — R10.13 DYSPEPSIA: ICD-10-CM

## 2020-11-18 LAB
% GRANULOCYTES: 46 % (ref 42.2–75.2)
HCT VFR BLD AUTO: 45.5 % (ref 35–46)
HEMOGLOBIN: 14.4 G/DL (ref 11.8–15.5)
LYMPHOCYTES NFR BLD AUTO: 45.8 % (ref 20.5–51.1)
MCH RBC QN AUTO: 30.7 PG (ref 27–31)
MCHC RBC AUTO-ENTMCNC: 31.6 G/DL (ref 33–37)
MCV RBC AUTO: 97 FL (ref 80–100)
MONOCYTES NFR BLD AUTO: 8.2 % (ref 1.7–9.3)
PLATELET # BLD AUTO: 258 K/UL (ref 140–450)
RBC # BLD AUTO: 4.69 X10/CMM (ref 3.7–5.2)
WBC # BLD AUTO: 3.8 X10/CMM (ref 3.8–11)

## 2020-11-18 PROCEDURE — 85025 COMPLETE CBC W/AUTO DIFF WBC: CPT | Performed by: NURSE PRACTITIONER

## 2020-11-19 LAB
ALBUMIN SERPL-MCNC: 4.3 G/DL (ref 3.7–4.7)
ALBUMIN/GLOB SERPL: 1.6 {RATIO} (ref 1.2–2.2)
ALP SERPL-CCNC: 76 IU/L (ref 39–117)
ALT SERPL-CCNC: 6 IU/L (ref 0–32)
AST SERPL-CCNC: 19 IU/L (ref 0–40)
BILIRUB SERPL-MCNC: 0.7 MG/DL (ref 0–1.2)
BUN SERPL-MCNC: 14 MG/DL (ref 8–27)
BUN/CREATININE RATIO: 19 (ref 12–28)
CALCIUM SERPL-MCNC: 9.3 MG/DL (ref 8.7–10.3)
CHLORIDE SERPLBLD-SCNC: 105 MMOL/L (ref 96–106)
CHOLEST SERPL-MCNC: 159 MG/DL (ref 100–199)
CREAT SERPL-MCNC: 0.72 MG/DL (ref 0.57–1)
EGFR IF AFRICN AM: 97 ML/MIN/1.73
EGFR IF NONAFRICN AM: 84 ML/MIN/1.73
GLOBULIN, TOTAL: 2.7 G/DL (ref 1.5–4.5)
GLUCOSE SERPL-MCNC: 76 MG/DL (ref 65–99)
HDLC SERPL-MCNC: 50 MG/DL
LDL/HDL RATIO: 1.8 RATIO (ref 0–3.2)
LDLC SERPL CALC-MCNC: 88 MG/DL (ref 0–99)
POTASSIUM SERPL-SCNC: 4.4 MMOL/L (ref 3.5–5.2)
PROT SERPL-MCNC: 7 G/DL (ref 6–8.5)
SODIUM SERPL-SCNC: 142 MMOL/L (ref 134–144)
TOTAL CO2: 28 MMOL/L (ref 20–29)
TRIGL SERPL-MCNC: 114 MG/DL (ref 0–149)
VLDLC SERPL CALC-MCNC: 21 MG/DL (ref 5–40)

## 2020-11-25 ENCOUNTER — OFFICE VISIT (OUTPATIENT)
Dept: FAMILY MEDICINE | Facility: CLINIC | Age: 70
End: 2020-11-25

## 2020-11-25 VITALS
HEIGHT: 63 IN | OXYGEN SATURATION: 99 % | DIASTOLIC BLOOD PRESSURE: 72 MMHG | TEMPERATURE: 97.9 F | SYSTOLIC BLOOD PRESSURE: 124 MMHG | WEIGHT: 197.2 LBS | BODY MASS INDEX: 34.94 KG/M2 | HEART RATE: 60 BPM

## 2020-11-25 DIAGNOSIS — E66.01 MORBID OBESITY (H): ICD-10-CM

## 2020-11-25 DIAGNOSIS — Z86.73 HISTORY OF STROKE: ICD-10-CM

## 2020-11-25 DIAGNOSIS — K51.218 ULCERATIVE PROCTITIS WITH OTHER COMPLICATION (H): ICD-10-CM

## 2020-11-25 DIAGNOSIS — M85.80 OSTEOPENIA, UNSPECIFIED LOCATION: ICD-10-CM

## 2020-11-25 DIAGNOSIS — H91.93 HEARING DIFFICULTY OF BOTH EARS: ICD-10-CM

## 2020-11-25 DIAGNOSIS — E78.5 HYPERLIPIDEMIA WITH TARGET LDL LESS THAN 100: ICD-10-CM

## 2020-11-25 DIAGNOSIS — M25.552 HIP PAIN, LEFT: ICD-10-CM

## 2020-11-25 DIAGNOSIS — Z00.00 ENCOUNTER FOR MEDICARE ANNUAL WELLNESS EXAM: Primary | ICD-10-CM

## 2020-11-25 PROCEDURE — 99214 OFFICE O/P EST MOD 30 MIN: CPT | Mod: 25 | Performed by: NURSE PRACTITIONER

## 2020-11-25 PROCEDURE — G0439 PPPS, SUBSEQ VISIT: HCPCS | Performed by: NURSE PRACTITIONER

## 2020-11-25 RX ORDER — ROSUVASTATIN CALCIUM 5 MG/1
5 TABLET, COATED ORAL DAILY
Qty: 90 TABLET | Refills: 3 | Status: SHIPPED | OUTPATIENT
Start: 2020-11-25 | End: 2021-07-12

## 2020-11-25 RX ORDER — ALENDRONATE SODIUM 70 MG/1
70 TABLET ORAL
Qty: 12 TABLET | Refills: 3 | Status: SHIPPED | OUTPATIENT
Start: 2020-11-25 | End: 2021-07-12

## 2020-11-25 ASSESSMENT — ANXIETY QUESTIONNAIRES
5. BEING SO RESTLESS THAT IT IS HARD TO SIT STILL: NOT AT ALL
2. NOT BEING ABLE TO STOP OR CONTROL WORRYING: NOT AT ALL
1. FEELING NERVOUS, ANXIOUS, OR ON EDGE: NOT AT ALL
7. FEELING AFRAID AS IF SOMETHING AWFUL MIGHT HAPPEN: NOT AT ALL
3. WORRYING TOO MUCH ABOUT DIFFERENT THINGS: NOT AT ALL
GAD7 TOTAL SCORE: 1
6. BECOMING EASILY ANNOYED OR IRRITABLE: SEVERAL DAYS

## 2020-11-25 ASSESSMENT — PATIENT HEALTH QUESTIONNAIRE - PHQ9
SUM OF ALL RESPONSES TO PHQ QUESTIONS 1-9: 2
5. POOR APPETITE OR OVEREATING: NOT AT ALL

## 2020-11-25 ASSESSMENT — MIFFLIN-ST. JEOR: SCORE: 1365.68

## 2020-11-25 NOTE — PROGRESS NOTES
SUBJECTIVE:   Kylee Cavazos is a 70 year old female who presents for Preventive Visit.    Had been going to an exercise facility for seniors but this closed for good- thinks she needs a new left hip. Has a hx of bilateral knee replacement and right hip replacement. Pain is familiar. Not impacting her ability to sleep or function. Takes three tablets of Tylenol arthritis per day. Can't take Aleve due to her ulcerative colitis. - follows with GI, states GI keeps her scheduled for colonoscopies.    #1) L hip pain: Hx of OA, s/p bilateral knee replacement and R hip replacement. Now feeling L hip pain, states it is a familiar pain- not impacting sleep or function. Taking three tablets of Tylenol arthritis daily. Not currently seeing ortho for the hip or PT, denies need for intervention at this time. Can't take NSAIDs due to hx of ulcerative colitis.    #2) Ulcerative colitis: Follows with GI, states this is stable. GI schedules her colonoscopies.    #3) Weight: Morbidly obese, but has lost 10 pounds in the past year. Had been doing HIIT exercise at a fitness center for seniors, but this closed during the pandemic. Trying to work on staying active without this resource, which was also a good source of socialization and emotional support.    #4) Osteopenia: Was on Fosamax for five years, now off for several. DEXA this past summer showed elevated FRAX risk. Taking calcium and vitamin D, works on weight bearing exercise. Open to restarting Fosamax.    #5) HLD: Tolerates simvastatin well without concerns. She does have a hx of CVA. LDL is 88, which has been stable the past several years.    #6) Hearing: Reports chronic issues with tinnitus, stable over several years. Her  thinks she doesn't hear well- she thinks she doesn't hear him well because he mumbles, but doesn't have difficulty hearing otherwise per her report. Did have difficulty hearing a beep on her thermometer.        Patient has been advised  "of split billing requirements and indicates understanding: Yes  Are you in the first 12 months of your Medicare Part B coverage?  No    Physical Health:    In general, how would you rate your overall physical health? good    Outside of work, how many days during the week do you exercise? 2-3 days/week    Outside of work, approximately how many minutes a day do you exercise?less than 15 minutes    If you drink alcohol do you typically have >3 drinks per day or >7 drinks per week? No    Do you usually eat at least 4 servings of fruit and vegetables a day, include whole grains & fiber and avoid regularly eating high fat or \"junk\" foods? NO    Do you have any problems taking medications regularly?  No    Do you have any side effects from medications? not applicable    Needs assistance for the following daily activities: no assistance needed    Which of the following safety concerns are present in your home?  none identified     Hearing impairment: Yes, see above    In the past 6 months, have you been bothered by leaking of urine? yes    Mental Health:    In general, how would you rate your overall mental or emotional health? good  PHQ-2 Score:      Do you feel safe in your environment? Yes    Have you ever done Advance Care Planning? (For example, a Health Directive, POLST, or a discussion with a medical provider or your loved ones about your wishes): Yes, advance care planning is on file.    Additional concerns to address?      Fall risk:  Fallen 2 or more times in the past year?: No  Any fall with injury in the past year?: No    Cognitive Screenin) Repeat 3 items (Leader, Season, Table)    2) Clock draw: NORMAL  3) 3 item recall: Recalls 3 objects  Results: 3 items recalled: COGNITIVE IMPAIRMENT LESS LIKELY    Mini-CogTM Copyright [employer]. Licensed by the author for use in [facility] [employer]; reprinted with permission ([email or web address]@.[email or web address]). All rights reserved.      Do you have sleep apnea, excessive snoring or " daytime drowsiness?: no      Reviewed and updated as needed this visit by clinical staff                 Reviewed and updated as needed this visit by Provider  Tobacco  Allergies  Meds  Problems  Med Hx  Surg Hx  Fam Hx         Social History     Tobacco Use     Smoking status: Former Smoker     Packs/day: 0.50     Years: 35.00     Pack years: 17.50     Types: Cigarettes     Quit date: 2009     Years since quittin.5     Smokeless tobacco: Never Used   Substance Use Topics     Alcohol use: Yes     Alcohol/week: 5.0 standard drinks     Types: 5 Standard drinks or equivalent per week                           Current providers sharing in care for this patient include:   Patient Care Team:  Carmencita Lindsay APRN CNP as PCP - General (Nurse Practitioner)  Damari Darden MD as Assigned PCP    The following health maintenance items are reviewed in Epic and correct as of today:  Health Maintenance   Topic Date Due     PHQ-2  2020     FALL RISK ASSESSMENT  2020     MEDICARE ANNUAL WELLNESS VISIT  2021     DTAP/TDAP/TD IMMUNIZATION (3 - Td) 2022     DEXA  2022     MAMMO SCREENING  2022     ADVANCE CARE PLANNING  2024     LIPID  2025     COLORECTAL CANCER SCREENING  2027     HEPATITIS C SCREENING  Completed     INFLUENZA VACCINE  Completed     Pneumococcal Vaccine: 65+ Years  Completed     ZOSTER IMMUNIZATION  Completed     Pneumococcal Vaccine: Pediatrics (0 to 5 Years) and At-Risk Patients (6 to 64 Years)  Aged Out     IPV IMMUNIZATION  Aged Out     MENINGITIS IMMUNIZATION  Aged Out     HEPATITIS B IMMUNIZATION  Aged Out     Lab work is in process  Labs reviewed in EPIC  BP Readings from Last 3 Encounters:   20 124/72   19 118/62   10/09/19 116/74    Wt Readings from Last 3 Encounters:   20 89.4 kg (197 lb 3.2 oz)   19 93.9 kg (207 lb)   10/09/19 94.1 kg (207 lb 6.4 oz)                  Patient Active Problem List    Diagnosis     GERD (gastroesophageal reflux disease)     Hyperlipidemia with target LDL less than 100     History of stroke     Health Care Home     Hx of total knee arthroplasty     Low back pain associated with a spinal disorder other than radiculopathy or spinal stenosis     Ulcerative proctitis with other complication (H)     Edema of both legs     Osteopenia, unspecified location     Obesity (BMI 35.0-39.9) with comorbidity (H)     Past Surgical History:   Procedure Laterality Date     ARTHROPLASTY KNEE  2014    Procedure: ARTHROPLASTY KNEE;  Surgeon: Brian Tobin MD;  Location: [facility] OR     ARTHROPLASTY KNEE Right 2014    Procedure: ARTHROPLASTY KNEE;  Surgeon: Brian Tobin MD;  Location: [facility] OR     DENTAL SURGERY Left 2017    left dental implant     JOINT REPLACEMENT, HIP RT/LT Right     Joint Replacement Hip RT/LT       Social History     Tobacco Use     Smoking status: Former Smoker     Packs/day: 0.50     Years: 35.00     Pack years: 17.50     Types: Cigarettes     Quit date: 2009     Years since quittin.5     Smokeless tobacco: Never Used   Substance Use Topics     Alcohol use: Yes     Alcohol/week: 5.0 standard drinks     Types: 5 Standard drinks or equivalent per week     Family History   Problem Relation Age of Onset     Diabetes Mother      Hypertension Mother      C.A.D. Father      C.A.D. Brother          Current Outpatient Medications   Medication Sig Dispense Refill     alendronate (FOSAMAX) 70 MG tablet Take 1 tablet (70 mg) by mouth every 7 days 12 tablet 3     aspirin 325 MG tablet Take 1 tablet (325 mg) by mouth daily 20 tablet 0     calcium gluconate 500 MG TABS tablet Take 1,500 mg by mouth daily       Cholecalciferol (VITAMIN D3 PO) Take 4,000 Units by mouth daily        Mesalamine 4 GM/60ML SF ENEM See Admin Instructions Uses every 4th  nightly       omeprazole (PRILOSEC) 20 MG DR capsule TAKE 1 CAPSULE BY MOUTH DAILY 90 capsule 3     polyethylene glycol  "(MIRALAX) powder Take 1 capful by mouth daily.       rosuvastatin (CRESTOR) 5 MG tablet Take 1 tablet (5 mg) by mouth daily 90 tablet 3     No Known Allergies    ROS:  Gen, HEENT, breast, CV, resp, GI, , MSK, heme/lymph, endo, skin, neuro, psych negative except as listed per HPI      OBJECTIVE:   Physical Exam:    /72   Pulse 60   Temp 97.9  F (36.6  C)   Ht 1.588 m (5' 2.5\")   Wt 89.4 kg (197 lb 3.2 oz)   SpO2 99%   BMI 35.49 kg/m      CONSTITUTIONAL: Alert non-toxic appearing female in no acute distress  HEAD: Normocephalic, atraumatic  EYES: PERRLA; no scleral icterus; sclera without injection  ENT: EACs clear bilaterally, TMs pearly gray and intact with good visualization of bony landmarks and cone of light, no bulging or erythema; nares patent without ulceration or edema; oropharynx pink without lesions; posterior pharynx without exudates  NECK: Neck supple without lymphadenopathy, thyroid without enlargement or nodularity  BREAST: Declined  RESPIRATORY: Lungs clear to auscultation, respirations unlabored  CV: Regular rate and rhythm, S1S2, no clicks, murmurs, rubs, or gallops; bilateral lower extremities without edema, posterior tibialis pulses 2+ bilaterally  GASTROINTESTINAL: Normoactive bowel sounds x4 quadrants, abdomen soft, non-distended, and non-tender to palpation without masses or organomegaly  MUSCULOSKELETAL: Moves all extremities, no obvious muscle wasting; L hip without tenderness to palpation but positive STEPHANIE and FADIR. R hip negative STEPHANIE and FADIR.  NEUROLOGIC: No gross deficits, normal gait  SKIN: Appropriate color for race, warm and dry, no suspicious lesions or rashes  PSYCHIATRIC: Pleasant and interactive, affect euthymic, makes appropriate eye contact, thought process logical      Diagnostic Test Results:  Labs reviewed in Epic  No results found for this or any previous visit (from the past 24 hour(s)).    ASSESSMENT / PLAN:   Kylee was seen today for physical and hearing " screening.    Diagnoses and all orders for this visit:    Encounter for Medicare annual wellness exam    Generally well appearing 70 year old female. See below for counseling. Up to date on mammogram, DEXA, colonoscopy.    Osteopenia, unspecified location  -     alendronate (FOSAMAX) 70 MG tablet; Take 1 tablet (70 mg) by mouth every 7 days    Elevated FRAX score. Continue calcium, vit D, weight bearing exercise and restart alendronate.    Hyperlipidemia with target LDL less than 100  -     rosuvastatin (CRESTOR) 5 MG tablet; Take 1 tablet (5 mg) by mouth daily    Given her hx of CVA, prefer for higher intensity statin to ideally lower her LDL even further (<70 if possible). She is nervous about possibly developing myalgias- will trial 5mg rosuvastatin, recheck lipids in 3-6 months, and if not at goal will increase at that time.    Ulcerative proctitis with other complication (H)    Follows with GI    Morbid obesity (H)    Working on weight loss, discussed [employer] Youtube videos which are free right now    Hip pain, left    Likely due to her hx of OA, discussed topical diclofenac, heat/ice, PT    Hearing difficulty of both ears    Question if her chronic bilateral tinnitus is due to aspirin, which she needs to be on indefinitely. Not bothering her right now. To see audiology if her hearing worsens or if she prioritizes this.    History of stroke    On aspirin, intensifying statin therapy    Patient has been advised of split billing requirements and indicates understanding: Yes    COUNSELING:  Reviewed preventive health counseling, as reflected in patient instructions  Special attention given to:       Regular exercise       Healthy diet/nutrition       Hearing screening       Immunizations    Up to date           Osteoporosis Prevention/Bone Health       Consider lung cancer screening for ages 55-80 years and 30 pack-year smoking history- <30 year pack history       Colon cancer screening- follows with GI    "    The ASCVD Risk score (Chon RAHMAN Jr., et al., 2013) failed to calculate for the following reasons:    The patient has a prior MI or stroke diagnosis       Advanced Planning     Estimated body mass index is 35.49 kg/m  as calculated from the following:    Height as of this encounter: 1.588 m (5' 2.5\").    Weight as of this encounter: 89.4 kg (197 lb 3.2 oz).    Weight management plan: Discussed healthy diet and exercise guidelines and commended on efforts for weight management and weight loss    She reports that she quit smoking about 11 years ago. Her smoking use included cigarettes. She has a 17.50 pack-year smoking history. She has never used smokeless tobacco.    Appropriate preventive services were discussed with this patient, including applicable screening as appropriate for cardiovascular disease, diabetes, osteopenia/osteoporosis, and glaucoma.  As appropriate for age/gender, discussed screening for colorectal cancer, prostate cancer, breast cancer, and cervical cancer. Checklist reviewing preventive services available has been given to the patient.    Reviewed patients plan of care and provided an AVS. The Basic Care Plan (routine screening as documented in Health Maintenance) for Kylee meets the Care Plan requirement. This Care Plan has been established and reviewed with the Patient.    Counseling Resources:  ATP IV Guidelines  Pooled Cohorts Equation Calculator  Breast Cancer Risk Calculator  BRCA-Related Cancer Risk Assessment: FHS-7 Tool  FRAX Risk Assessment  ICSI Preventive Guidelines  Dietary Guidelines for Americans, 2010  USDA's MyPlate  ASA Prophylaxis  Lung CA Screening    RANJAN Khalil [facility]  "

## 2020-11-25 NOTE — PATIENT INSTRUCTIONS
Patient Education   Personalized Prevention Plan  You are due for the preventive services outlined below.  Your care team is available to assist you in scheduling these services.  If you have already completed any of these items, please share that information with your care team to update in your medical record.  Health Maintenance Due   Topic Date Due     PHQ-2  01/01/2020     FALL RISK ASSESSMENT  11/18/2020         Switch simvastatin to rosuvastatin- goal LDL <70 with your history of a stroke- we can recheck this in 3-6 months, depending on your comfort level.  Try [employer] videos on Youtube for HIIT exercises you had been doing and cardiovascular fitness  Restart alendronate once per week- we will recheck a DEXA (bone density test) in 2 years  Try diclofenac gel (Voltaren) for the hip pain- available over the counter  Suspect tinnitus could be due to aspirin, but let Carmencita know if it worsens over time. Schedule a hearing consult with Marlene if your hearing worsens.    Patient Education     Coronavirus Disease 2019 (COVID-19): Caring for Yourself or Others   If you or a household member have symptoms of COVID-19, follow the guidelines below. This will help you manage symptoms and keep the virus from spreading.  If you have symptoms of COVID-19    Stay home and contact your care team. They will tell you what to do.    Don t panic. Keep in mind that other illnesses can cause similar symptoms.    Stay away from work, school, and public places.    Limit physical contact with others in your home. Limit visitors. No kissing.  Clean surfaces you touch with disinfectant.  If you need to cough or sneeze, do it into a tissue. Then throw the tissue into the trash. If you don't have tissues, cough or sneeze into the bend of your elbow.  Don t share food or personal items with people in your household. This includes items like eating and drinking utensils, towels, and bedding.  Wear a cloth face mask around other  people. During a public health emergency, medical face masks may be reserved for healthcare workers. You may need to make a cloth face mask of your own. You can do this using a bandana, T-shirt, or other cloth. The [facility] has instructions on how to make a face mask. Wear the mask so that it covers both your nose and mouth.  If you need to go to a hospital or clinic, call ahead to let them know. Expect the care team to wear masks, gowns, gloves, and eye protection. You may be put in a separate room.  Follow all instructions from your care team.    If you ve been diagnosed with COVID-19    Stay home and away from others, including other people in your home. (This is called self-isolation.) Don t leave home unless you need to get medical care. Don t go to work, school, or public places. Don t use buses, taxis, or other public transportation.    Follow all instructions from your care team.    If you need to go to a hospital or clinic, call ahead to let them know. Expect the care team to wear masks, gowns, gloves, and eye protection. You may be put in a separate room.    Wear a face mask over your nose and mouth. This is to protect others from your germs. If you can t wear a mask, your caregivers should wear one. You may need to make your own mask using a bandana, T-shirt, or other cloth. See the CDC s instructions on how to make a face mask.    Have no contact with pets and other animals.    Don t share food or personal items with people in your household. This includes items like eating and drinking utensils, towels, and bedding.    If you need to cough or sneeze, do it into a tissue. Then throw the tissue into the trash. If you don't have tissues, cough or sneeze into the bend of your elbow.    Wash your hands often.    Self-care at home   At this time, there is no medicine approved to prevent or treat COVID-19. Experts are testing different medicines, trying to find one that works.  So far, the most proven treatment is  to support your body while it fights the virus.    Getting extra rest.    Drink extra fluids 6 to 8 glasses of liquids each day), unless a doctor has told you not to. Ask your care team which fluids are best for you. Avoid fluids that contain caffeine or alcohol.    Taking over-the-counter (OTC) medicine to reduce pain and fever. Your care team will tell you which medicine to use.  If you ve been in the hospital for COVID-19, follow your care team s instructions. They will tell you when to stop self-isolation. They may also have you change positions to help your breathing (such as lying on your belly).  If a test showed that you have COVID-19, you may be asked to donate plasma after you ve recovered. (This is called COVID-19 convalescent plasma donation.) The plasma may contain antibodies to help fight the virus in others. Scientists are testing whether this might be a treatment in the future. For more information, talk to your care team.  Caring for a sick person     Follow all instructions from the care team.    Wash your hands often.    Wear protective clothing as advised.    Make sure the sick person wears a mask. If they can't wear a mask, don t stay in the same room with them. If you must be in the same room, wear a face mask. Make sure the mask covers both the nose and mouth.    Keep track of the sick person s symptoms.    Clean surfaces often with disinfectant. This includes phones, kitchen counters, fridge door handles, bathroom surfaces, and others.    Don t let anyone share household items with the sick person. This includes eating and drinking tools, towels, sheets, or blankets.    Clean fabrics and laundry well.    Keep other people and pets away from the sick person.    When you can stop self-isolation  When you are sick with COVID-19, you should stay away from other people. This is called self-isolation. The rules for ending self-isolation depend on your health in general.  If you are normally  healthy  You can stop self-isolation when all 3 of these are true:    You ve had no fever--and no medicine that reduces fever--for at least 24 hours. And     Your symptoms are better, such as cough or trouble breathing. And     At least 10 days have passed since your symptoms first started.  Talk with your care team before you leave home. They may tell you it s okay to leave, or they may give you different advice.  If you have a weak immune system  If you re being treated for cancer, have an immune disorder (such as HIV), or have had a transplant &$40;organ or bone marrow), follow your care team s instructions. You may be asked to stay home from 10 days to 20 days after your symptoms first started. Your care team may want to re-test you for COVID-19.  When you return to public settings  When you are well enough to go outside your home, follow the CDC s guidance on cloth face masks.    Anyone over age 2 should wear a face mask in public, especially when it's hard to socially distance. This includes public transit, public protests and marches, and crowded stores, bars, and restaurants.    Face masks are most likely to reduce the spread of COVID-19 when they are widely used by people who are out in the public.  Certain people should not wear a face covering. These include:    Children under 2 years old    Anyone with a health, developmental, or mental health condition that can be made worse by wearing a mask    Anyone who is unconscious or unable to remove the face covering without help. See the CDC's guidance on who should not wear a face mask.  When to call your care team  Call your care team right away if a sick person has any of these:    Trouble breathing    Pain or pressure in chest  If a sick person has any of these, call 911:    Trouble breathing that gets worse    Pain or pressure in chest that gets worse    Blue tint to lips or face    Fast or irregular heartbeat    Confusion or trouble waking    Fainting or  loss of consciousness    Coughing up blood  Going home from the hospital   If you have COVID-19 and were recently in the hospital:    Follow the instructions above for self-care and isolation.    Follow the hospital care team s instructions.    Ask questions if anything is unclear to you. Write down answers so you remember them.  Date last modified: 10/13/2020  [employer] last reviewed this educational content on 4/1/2020  This information has been modified by your health care provider with permission from the publisher.    1549-8057 [employer]. [address] [place] [id number]. All rights reserved. This information is not intended as a substitute for professional medical care. Always follow your healthcare professional's instructions.           General Information:     Today you had your appointment with Carmencita Lindsay CNP  My hours are:     Monday 8 AM - 5 PM  Tuesday: 8 AM - 5 PM  Wednesday: 8 AM - 5 PM  Thursday: 8 AM - 5 PM     I am not in the office Fridays. Therefore, non urgent calls received on Friday will be addressed when I am back in the office on Monday.      If lab work was done today as part of your evaluation you will generally be contacted via My Chart, mail, or phone with the results within 1-5 days. If there is an alarming result we will contact you by phone. Lab results come back at varying times- I generally wait until all labs are resulted before making comments on results. Please note labs are automatically released to My Chart once available.      If you need refills please contact your pharmacy. They will send a refill request to me to review. Please allow 3 business days for us to process all refill requests.      Please call or send a medical message with any questions or concerns    We are working to improve our digital reputation.  Would you please help us by reviewing our clinic on Google and/or Facebook?  These are links for filling out a review for the  clinic:    [email or web address]                [email or web address]    We truly appreciate you taking the time to do this.

## 2020-11-26 ASSESSMENT — ANXIETY QUESTIONNAIRES: GAD7 TOTAL SCORE: 1

## 2021-01-20 DIAGNOSIS — E78.5 HYPERLIPIDEMIA WITH TARGET LDL LESS THAN 100: ICD-10-CM

## 2021-01-20 RX ORDER — SIMVASTATIN 10 MG
TABLET ORAL
Qty: 90 TABLET | Refills: 2 | Status: SHIPPED | OUTPATIENT
Start: 2021-01-20 | End: 2021-07-06

## 2021-01-20 NOTE — TELEPHONE ENCOUNTER
LOV 11/25/2020 & lipid panel done 11/18/2020. Simvastatin refilled 9 months.    RANJAN Sanabria CNP on 1/20/2021 at 7:56 AM

## 2021-03-09 ENCOUNTER — IMMUNIZATION (OUTPATIENT)
Dept: NURSING | Facility: CLINIC | Age: 71
End: 2021-03-09
Payer: MEDICARE

## 2021-03-09 PROCEDURE — 0001A PR COVID VAC PFIZER DIL RECON 30 MCG/0.3 ML IM: CPT

## 2021-03-09 PROCEDURE — 91300 PR COVID VAC PFIZER DIL RECON 30 MCG/0.3 ML IM: CPT

## 2021-03-30 ENCOUNTER — IMMUNIZATION (OUTPATIENT)
Dept: NURSING | Facility: CLINIC | Age: 71
End: 2021-03-30
Attending: INTERNAL MEDICINE
Payer: MEDICARE

## 2021-03-30 PROCEDURE — 91300 PR COVID VAC PFIZER DIL RECON 30 MCG/0.3 ML IM: CPT

## 2021-03-30 PROCEDURE — 0002A PR COVID VAC PFIZER DIL RECON 30 MCG/0.3 ML IM: CPT

## 2021-06-10 DIAGNOSIS — Z11.59 ENCOUNTER FOR SCREENING FOR OTHER VIRAL DISEASES: ICD-10-CM

## 2021-06-11 ENCOUNTER — TRANSFERRED RECORDS (OUTPATIENT)
Dept: FAMILY MEDICINE | Facility: CLINIC | Age: 71
End: 2021-06-11

## 2021-06-11 LAB — CREAT SERPL-MCNC: 1.12 MG/DL (ref 0.6–1.02)

## 2021-07-06 ENCOUNTER — OFFICE VISIT (OUTPATIENT)
Dept: FAMILY MEDICINE | Facility: CLINIC | Age: 71
End: 2021-07-06

## 2021-07-06 VITALS
WEIGHT: 207.38 LBS | HEART RATE: 53 BPM | BODY MASS INDEX: 36.75 KG/M2 | HEIGHT: 63 IN | DIASTOLIC BLOOD PRESSURE: 68 MMHG | SYSTOLIC BLOOD PRESSURE: 118 MMHG | RESPIRATION RATE: 16 BRPM | OXYGEN SATURATION: 98 %

## 2021-07-06 DIAGNOSIS — E66.01 MORBID OBESITY (H): ICD-10-CM

## 2021-07-06 DIAGNOSIS — Z01.818 PREOP GENERAL PHYSICAL EXAM: Primary | ICD-10-CM

## 2021-07-06 DIAGNOSIS — K51.218 ULCERATIVE PROCTITIS WITH OTHER COMPLICATION (H): ICD-10-CM

## 2021-07-06 DIAGNOSIS — M16.0 PRIMARY OSTEOARTHRITIS OF BOTH HIPS: ICD-10-CM

## 2021-07-06 LAB
% GRANULOCYTES: 62.3 % (ref 42.2–75.2)
HCT VFR BLD AUTO: 41.2 % (ref 35–46)
HEMOGLOBIN: 14.1 G/DL (ref 11.8–15.5)
LYMPHOCYTES NFR BLD AUTO: 30.6 % (ref 20.5–51.1)
MCH RBC QN AUTO: 31.3 PG (ref 27–31)
MCHC RBC AUTO-ENTMCNC: 34.3 G/DL (ref 33–37)
MCV RBC AUTO: 91.2 FL (ref 80–100)
MONOCYTES NFR BLD AUTO: 7.1 % (ref 1.7–9.3)
PLATELET # BLD AUTO: 251 K/UL (ref 140–450)
RBC # BLD AUTO: 4.52 X10/CMM (ref 3.7–5.2)
WBC # BLD AUTO: 4.9 X10/CMM (ref 3.8–11)

## 2021-07-06 PROCEDURE — 99214 OFFICE O/P EST MOD 30 MIN: CPT | Performed by: NURSE PRACTITIONER

## 2021-07-06 PROCEDURE — 36415 COLL VENOUS BLD VENIPUNCTURE: CPT | Performed by: NURSE PRACTITIONER

## 2021-07-06 PROCEDURE — 93000 ELECTROCARDIOGRAM COMPLETE: CPT | Mod: 59 | Performed by: NURSE PRACTITIONER

## 2021-07-06 PROCEDURE — 85025 COMPLETE CBC W/AUTO DIFF WBC: CPT | Performed by: NURSE PRACTITIONER

## 2021-07-06 ASSESSMENT — MIFFLIN-ST. JEOR: SCORE: 1406.84

## 2021-07-06 NOTE — PATIENT INSTRUCTIONS

## 2021-07-06 NOTE — H&P (VIEW-ONLY)
[facility]  [address] [place] [address]  Phone: [phone]  Fax: [phone]  Primary Provider: Carmencita Lindsay  Pre-op Performing Provider: CARMENCITA LINDSAY      PREOPERATIVE EVALUATION:  Today's date: 7/6/2021  Preoperative Questionnaire:   Yes - Have you ever had a heart attack or stroke? Stroke 2009  No - Have you ever had surgery on your heart or blood vessels, such as a stent, coronary (heart) bypass, or surgery on an artery in the head, neck, heart, or legs?  No - Do you have chest pain when you are physically active?  No - Do you have a history of heart failure?  No - Do you currently have a cold, bronchitis, or symptoms of other respiratory (head and chest) infections?  No - Do you have a cough, shortness of breath, or wheezing?  No - Do you or anyone in your family have a history of blood clots?  No - Do you or anyone in your family have a serious bleeding problem, such as long-lasting bleeding after surgeries or cuts?  Yes - Have you ever had anemia or been told to take iron pills? Hx of ulcerative proctitis  Yes - Have you had any abnormal blood loss such as black, tarry or bloody stools, or abnormal vaginal bleeding? Hx of ulcerative proctitis  No - Have you ever had a blood transfusion?  Yes - Are you willing to have a blood transfusion if it is medically needed before, during, or after your surgery?  No - Have you or anyone in your family ever had problems with anesthesia (sedation for surgery)?  No - Do you have sleep apnea, excessive snoring, or daytime drowsiness?   No - Do you have any artifical heart valves or other implanted medical devices, such as a pacemaker, defibrillator, or continuous glucose monitor?  Yes - Do you have any artifical joints?Both knees, right hip  No - Are you allergic to latex?  No - Is there any chance that you may be pregnant?        Kylee ANASTACIO Cavazos is a 71 year old female who presents for a preoperative  evaluation.    Surgical Information:  Surgery/Procedure: Left Total Hip  Surgery Location: [facility]  Surgeon: Mau  Surgery Date: 7/13/21  Time of Surgery: 8am  Where patient plans to recover: At home with family  Fax number for surgical facility: Note does not need to be faxed, will be available electronically in Epic.    Type of Anesthesia Anticipated: to be determined    Assessment & Plan     The proposed surgical procedure is considered INTERMEDIATE risk.    Problem List Items Addressed This Visit        Digestive    Ulcerative proctitis with other complication (H)    Relevant Orders    Basic Metabolic Panel (8) ([facility])    Obesity (BMI 35.0-39.9) with comorbidity (H)      Other Visit Diagnoses     Preop general physical exam    -  Primary    Relevant Orders    EKG 12-lead complete w/read - Clinics (Completed)    CBC with Diff/Plt ([facility])    Primary osteoarthritis of both hips                Risks and Recommendations:  No additional risks    Medication Instructions:  Patient is to take all scheduled medications on the day of surgery EXCEPT for modifications listed below:   - aspirin: Discontinue aspirin 7-10 days prior to procedure to reduce bleeding risk. It should be resumed postoperatively.    - Statins: Continue taking on the day of surgery.   Hold all medications the morning of surgery except omeprazole    RECOMMENDATION:  No apparent contraindications with proposed procedure, appears medically appropriate to proceed with proposed procedure without further diagnostic evaluation.    Subjective     HPI related to upcoming procedure: Severe osteoarthritis of the left hip, causing significant pain. Has had R total hip and two total knee replacements.      No flowsheet data found.    Health Care Directive:  Patient has a Health Care Directive on file      Preoperative Review of :   reviewed - no record of controlled substances prescribed.      Status of Chronic Conditions:  HLD: On rosuvastatin, no chest  pain or pressure, no trouble breathing, no neurologic changes  Hx of CVA: 2009, on aspirin 325mg daily  Ulcerative proctitis: Mesalamine pills daily, mesalamine enemas every fifth day, follows with GI. Well controlled.  Osteoporosis: On alendronate, takes on Wednesdays  GERD: Controlled with omeprazole    Review of Systems  Gen, HEENT, CV, resp, GI, , MSK, heme/lymph, endo, skin, neuro, psych negative except as listed per HPI    Patient Active Problem List    Diagnosis Date Noted     Obesity (BMI 35.0-39.9) with comorbidity (H) 11/15/2018     Priority: Medium     Edema of both legs 11/14/2017     Priority: Medium     Osteopenia, unspecified location 11/14/2017     Priority: Medium     Ulcerative proctitis with other complication (H) 06/23/2017     Priority: Medium     Low back pain associated with a spinal disorder other than radiculopathy or spinal stenosis 10/21/2016     Priority: Medium     Hx of total knee arthroplasty 07/08/2014     Priority: Medium     Health Care Home 05/29/2014     Priority: Medium     State Tier Level:  Tier 1           History of stroke 05/17/2012     Priority: Medium     GERD (gastroesophageal reflux disease) 05/19/2011     Priority: Medium     Hyperlipidemia with target LDL less than 100 05/19/2011     Priority: Medium     Diagnosis updated by automated process. Provider to review and confirm.        Past Medical History:   Diagnosis Date     CAD (coronary artery disease)     based on CT angio multiple vessels     Collagenous colitis 2010    Documented on colonoscopy with biopsy     Ex-smoker      Gastro-oesophageal reflux disease      History of stroke 2009    L MCA , had embolectomy and Tpa     Hyperlipidemia LDL goal < 100      OA (osteoarthritis) of knee     ref  to [facility] 10/11// Dr MARK Tobin in 2014     Osteopenia     fosamax started 2011- will use until 2016     PFO (patent foramen ovale)     no treatment recommended, but pt could choose to close     Ulcerative colitis  with rectal bleeding (H)     biopsy at colonoscopy - consistent with Ulcerative colitis. treated with pred and suggested Rowasa enemas, if not helpful may need immunosupression.     Unspecified cerebral artery occlusion with cerebral infarction          Past Surgical History:   Procedure Laterality Date     ARTHROPLASTY KNEE  2014    Procedure: ARTHROPLASTY KNEE;  Surgeon: Brian Tobin MD;  Location: [facility]     ARTHROPLASTY KNEE Right 2014    Procedure: ARTHROPLASTY KNEE;  Surgeon: Brian Tobin MD;  Location: [facility] OR     DENTAL SURGERY Left 2017    left dental implant     JOINT REPLACEMENT, HIP RT/LT Right     Joint Replacement Hip RT/LT     Current Outpatient Medications   Medication Sig Dispense Refill     alendronate (FOSAMAX) 70 MG tablet Take 1 tablet (70 mg) by mouth every 7 days 12 tablet 3     aspirin 325 MG tablet Take 1 tablet (325 mg) by mouth daily 20 tablet 0     calcium gluconate 500 MG TABS tablet Take 1,500 mg by mouth daily       Cholecalciferol (VITAMIN D3 PO) Take 4,000 Units by mouth daily        Mesalamine 4 GM/60ML SF ENEM See Admin Instructions Uses every 4th  nightly       omeprazole (PRILOSEC) 20 MG DR capsule TAKE 1 CAPSULE BY MOUTH DAILY 90 capsule 3     polyethylene glycol (MIRALAX) powder Take 1 capful by mouth daily.       rosuvastatin (CRESTOR) 5 MG tablet Take 1 tablet (5 mg) by mouth daily 90 tablet 3     simvastatin (ZOCOR) 10 MG tablet TAKE 1 TABLET BY MOUTH AT BEDTIME 90 tablet 2       No Known Allergies     Social History     Tobacco Use     Smoking status: Former Smoker     Packs/day: 0.50     Years: 35.00     Pack years: 17.50     Types: Cigarettes     Quit date: 2009     Years since quittin.1     Smokeless tobacco: Never Used   Substance Use Topics     Alcohol use: Yes     Alcohol/week: 5.0 standard drinks     Types: 5 Standard drinks or equivalent per week     Family History   Problem Relation Age of Onset     Diabetes Mother       "Hypertension Mother      BRYANT. Father      BRYANT. Brother      History   Drug Use No         Objective     Physical Exam:    /68   Pulse 53   Resp 16   Ht 1.588 m (5' 2.5\")   Wt 94.1 kg (207 lb 6 oz)   SpO2 98%   BMI 37.32 kg/m      CONSTITUTIONAL: Alert non-toxic appearing female in no acute distress  HEAD: Normocephalic, atraumatic  EYES: PERRLA; no scleral icterus; sclera without injection  ENT: oropharynx pink without lesions; posterior pharynx without exudates  RESPIRATORY: Lungs clear to auscultation, respirations unlabored  CV: Regular rate and rhythm, S1S2, no clicks, murmurs, rubs, or gallops; bilateral lower extremities without edema  GASTROINTESTINAL: Normoactive bowel sounds x4 quadrants, abdomen soft, non-distended, and non-tender to palpation without masses or organomegaly  MUSCULOSKELETAL: Moves all extremities, no obvious muscle wasting  NEUROLOGIC: No gross deficits, normal gait  SKIN: Appropriate color for race, warm and dry, no suspicious lesions or rashes  PSYCHIATRIC: Pleasant and interactive, affect euthymic, makes appropriate eye contact, thought process logical        Recent Labs   Lab Test 06/11/21 11/18/20  0945 11/18/20 11/11/19  1046 11/11/19  1015   HGB  --   --  14.4 11.8  --    PLT  --   --  258 333  --    NA  --  142  --   --  141   POTASSIUM  --  4.4  --   --  4.4   CR 1.12* 0.72  --   --  1.02*        Diagnostics:  Labs pending at this time.  Results will be reviewed when available.   EKG: appears normal, NSR, sinus bradycardia, normal axis, normal intervals, no acute ST/T changes c/w ischemia, no LVH by voltage criteria, unchanged from previous tracings    Revised Cardiac Risk Index (RCRI):  The patient has the following serious cardiovascular risks for perioperative complications:  The patient has the following serious cardiovascular risks for perioperative complications such as (MI, PE, VFib and 3  AV Block):  Cerebrovascular Disease (TIA or CVA)  RCRI " Interpretation: 1 point: Class II (low risk - 0.9% complication rate)           Signed Electronically by: RANJAN Khalil CNP  Copy of this evaluation report is provided to requesting physician.

## 2021-07-06 NOTE — PROGRESS NOTES
[facility]  [address] [place] [address]  Phone: [phone]  Fax: [phone]  Primary Provider: Carmencita Lindsay  Pre-op Performing Provider: CARMENCITA LINDSAY      PREOPERATIVE EVALUATION:  Today's date: 7/6/2021  Preoperative Questionnaire:   Yes - Have you ever had a heart attack or stroke? Stroke 2009  No - Have you ever had surgery on your heart or blood vessels, such as a stent, coronary (heart) bypass, or surgery on an artery in the head, neck, heart, or legs?  No - Do you have chest pain when you are physically active?  No - Do you have a history of heart failure?  No - Do you currently have a cold, bronchitis, or symptoms of other respiratory (head and chest) infections?  No - Do you have a cough, shortness of breath, or wheezing?  No - Do you or anyone in your family have a history of blood clots?  No - Do you or anyone in your family have a serious bleeding problem, such as long-lasting bleeding after surgeries or cuts?  Yes - Have you ever had anemia or been told to take iron pills? Hx of ulcerative proctitis  Yes - Have you had any abnormal blood loss such as black, tarry or bloody stools, or abnormal vaginal bleeding? Hx of ulcerative proctitis  No - Have you ever had a blood transfusion?  Yes - Are you willing to have a blood transfusion if it is medically needed before, during, or after your surgery?  No - Have you or anyone in your family ever had problems with anesthesia (sedation for surgery)?  No - Do you have sleep apnea, excessive snoring, or daytime drowsiness?   No - Do you have any artifical heart valves or other implanted medical devices, such as a pacemaker, defibrillator, or continuous glucose monitor?  Yes - Do you have any artifical joints?Both knees, right hip  No - Are you allergic to latex?  No - Is there any chance that you may be pregnant?        Kylee ANASTACIO Cavazos is a 71 year old female who presents for a preoperative  evaluation.    Surgical Information:  Surgery/Procedure: Left Total Hip  Surgery Location: [facility]  Surgeon: Mau  Surgery Date: 7/13/21  Time of Surgery: 8am  Where patient plans to recover: At home with family  Fax number for surgical facility: Note does not need to be faxed, will be available electronically in Epic.    Type of Anesthesia Anticipated: to be determined    Assessment & Plan     The proposed surgical procedure is considered INTERMEDIATE risk.    Problem List Items Addressed This Visit        Digestive    Ulcerative proctitis with other complication (H)    Relevant Orders    Basic Metabolic Panel (8) ([facility])    Obesity (BMI 35.0-39.9) with comorbidity (H)      Other Visit Diagnoses     Preop general physical exam    -  Primary    Relevant Orders    EKG 12-lead complete w/read - Clinics (Completed)    CBC with Diff/Plt ([facility])    Primary osteoarthritis of both hips                Risks and Recommendations:  No additional risks    Medication Instructions:  Patient is to take all scheduled medications on the day of surgery EXCEPT for modifications listed below:   - aspirin: Discontinue aspirin 7-10 days prior to procedure to reduce bleeding risk. It should be resumed postoperatively.    - Statins: Continue taking on the day of surgery.   Hold all medications the morning of surgery except omeprazole    RECOMMENDATION:  No apparent contraindications with proposed procedure, appears medically appropriate to proceed with proposed procedure without further diagnostic evaluation.    Subjective     HPI related to upcoming procedure: Severe osteoarthritis of the left hip, causing significant pain. Has had R total hip and two total knee replacements.      No flowsheet data found.    Health Care Directive:  Patient has a Health Care Directive on file      Preoperative Review of :   reviewed - no record of controlled substances prescribed.      Status of Chronic Conditions:  HLD: On rosuvastatin, no chest  pain or pressure, no trouble breathing, no neurologic changes  Hx of CVA: 2009, on aspirin 325mg daily  Ulcerative proctitis: Mesalamine pills daily, mesalamine enemas every fifth day, follows with GI. Well controlled.  Osteoporosis: On alendronate, takes on Wednesdays  GERD: Controlled with omeprazole    Review of Systems  Gen, HEENT, CV, resp, GI, , MSK, heme/lymph, endo, skin, neuro, psych negative except as listed per HPI    Patient Active Problem List    Diagnosis Date Noted     Obesity (BMI 35.0-39.9) with comorbidity (H) 11/15/2018     Priority: Medium     Edema of both legs 11/14/2017     Priority: Medium     Osteopenia, unspecified location 11/14/2017     Priority: Medium     Ulcerative proctitis with other complication (H) 06/23/2017     Priority: Medium     Low back pain associated with a spinal disorder other than radiculopathy or spinal stenosis 10/21/2016     Priority: Medium     Hx of total knee arthroplasty 07/08/2014     Priority: Medium     Health Care Home 05/29/2014     Priority: Medium     State Tier Level:  Tier 1           History of stroke 05/17/2012     Priority: Medium     GERD (gastroesophageal reflux disease) 05/19/2011     Priority: Medium     Hyperlipidemia with target LDL less than 100 05/19/2011     Priority: Medium     Diagnosis updated by automated process. Provider to review and confirm.        Past Medical History:   Diagnosis Date     CAD (coronary artery disease)     based on CT angio multiple vessels     Collagenous colitis 2010    Documented on colonoscopy with biopsy     Ex-smoker      Gastro-oesophageal reflux disease      History of stroke 2009    L MCA , had embolectomy and Tpa     Hyperlipidemia LDL goal < 100      OA (osteoarthritis) of knee     ref  to [facility] 10/11// Dr MARK Tobin in 2014     Osteopenia     fosamax started 2011- will use until 2016     PFO (patent foramen ovale)     no treatment recommended, but pt could choose to close     Ulcerative colitis  with rectal bleeding (H)     biopsy at colonoscopy - consistent with Ulcerative colitis. treated with pred and suggested Rowasa enemas, if not helpful may need immunosupression.     Unspecified cerebral artery occlusion with cerebral infarction          Past Surgical History:   Procedure Laterality Date     ARTHROPLASTY KNEE  2014    Procedure: ARTHROPLASTY KNEE;  Surgeon: Brian Tobin MD;  Location: [facility]     ARTHROPLASTY KNEE Right 2014    Procedure: ARTHROPLASTY KNEE;  Surgeon: Brian Tobin MD;  Location: [facility]     DENTAL SURGERY Left 2017    left dental implant     JOINT REPLACEMENT, HIP RT/LT Right     Joint Replacement Hip RT/LT     Current Outpatient Medications   Medication Sig Dispense Refill     alendronate (FOSAMAX) 70 MG tablet Take 1 tablet (70 mg) by mouth every 7 days 12 tablet 3     aspirin 325 MG tablet Take 1 tablet (325 mg) by mouth daily 20 tablet 0     calcium gluconate 500 MG TABS tablet Take 1,500 mg by mouth daily       Cholecalciferol (VITAMIN D3 PO) Take 4,000 Units by mouth daily        Mesalamine 4 GM/60ML SF ENEM See Admin Instructions Uses every 4th  nightly       omeprazole (PRILOSEC) 20 MG DR capsule TAKE 1 CAPSULE BY MOUTH DAILY 90 capsule 3     polyethylene glycol (MIRALAX) powder Take 1 capful by mouth daily.       rosuvastatin (CRESTOR) 5 MG tablet Take 1 tablet (5 mg) by mouth daily 90 tablet 3     simvastatin (ZOCOR) 10 MG tablet TAKE 1 TABLET BY MOUTH AT BEDTIME 90 tablet 2       No Known Allergies     Social History     Tobacco Use     Smoking status: Former Smoker     Packs/day: 0.50     Years: 35.00     Pack years: 17.50     Types: Cigarettes     Quit date: 2009     Years since quittin.1     Smokeless tobacco: Never Used   Substance Use Topics     Alcohol use: Yes     Alcohol/week: 5.0 standard drinks     Types: 5 Standard drinks or equivalent per week     Family History   Problem Relation Age of Onset     Diabetes Mother       "Hypertension Mother      BRYANT. Father      BRYANT. Brother      History   Drug Use No         Objective     Physical Exam:    /68   Pulse 53   Resp 16   Ht 1.588 m (5' 2.5\")   Wt 94.1 kg (207 lb 6 oz)   SpO2 98%   BMI 37.32 kg/m      CONSTITUTIONAL: Alert non-toxic appearing female in no acute distress  HEAD: Normocephalic, atraumatic  EYES: PERRLA; no scleral icterus; sclera without injection  ENT: oropharynx pink without lesions; posterior pharynx without exudates  RESPIRATORY: Lungs clear to auscultation, respirations unlabored  CV: Regular rate and rhythm, S1S2, no clicks, murmurs, rubs, or gallops; bilateral lower extremities without edema  GASTROINTESTINAL: Normoactive bowel sounds x4 quadrants, abdomen soft, non-distended, and non-tender to palpation without masses or organomegaly  MUSCULOSKELETAL: Moves all extremities, no obvious muscle wasting  NEUROLOGIC: No gross deficits, normal gait  SKIN: Appropriate color for race, warm and dry, no suspicious lesions or rashes  PSYCHIATRIC: Pleasant and interactive, affect euthymic, makes appropriate eye contact, thought process logical        Recent Labs   Lab Test 06/11/21 11/18/20  0945 11/18/20 11/11/19  1046 11/11/19  1015   HGB  --   --  14.4 11.8  --    PLT  --   --  258 333  --    NA  --  142  --   --  141   POTASSIUM  --  4.4  --   --  4.4   CR 1.12* 0.72  --   --  1.02*        Diagnostics:  Labs pending at this time.  Results will be reviewed when available.   EKG: appears normal, NSR, sinus bradycardia, normal axis, normal intervals, no acute ST/T changes c/w ischemia, no LVH by voltage criteria, unchanged from previous tracings    Revised Cardiac Risk Index (RCRI):  The patient has the following serious cardiovascular risks for perioperative complications:  The patient has the following serious cardiovascular risks for perioperative complications such as (MI, PE, VFib and 3  AV Block):  Cerebrovascular Disease (TIA or CVA)  RCRI " Interpretation: 1 point: Class II (low risk - 0.9% complication rate)           Signed Electronically by: RANJAN Khalil CNP  Copy of this evaluation report is provided to requesting physician.

## 2021-07-06 NOTE — LETTER
[facility] [facility] [address] [place]  [address]  Phone: [phone]    July 8, 2021      Kylee CHAVES [place] [address] [place] [address]              Dear Kylee,   Here is a copy of your most recent labs. Your labs are within expected limits without significant abnormalities. Please let me know if you have questions or concerns.     Take care,   Carmencita Lindsay CNP     Results for orders placed or performed in visit on 07/06/21   CBC with Diff/Plt ([facility])     Status: Abnormal   Result Value Ref Range    WBC x10/cmm 4.9 3.8 - 11.0 x10/cmm    % Lymphocytes 30.6 20.5 - 51.1 %    % Monocytes 7.1 1.7 - 9.3 %    % Granulocytes 62.3 42.2 - 75.2 %    RBC x10/cmm 4.52 3.7 - 5.2 x10/cmm    Hemoglobin 14.1 11.8 - 15.5 g/dl    Hematocrit 41.2 35 - 46 %    MCV 91.2 80 - 100 fL    MCH 31.3 (A) 27.0 - 31.0 pg    MCHC 34.3 33.0 - 37.0 g/dL    Platelet Count 251 140 - 450 K/uL   Basic Metabolic Panel (8) ([facility])     Status: None   Result Value Ref Range    Glucose 92 65 - 99 mg/dL    Urea Nitrogen 17 8 - 27 mg/dL    Creatinine 0.94 0.57 - 1.00 mg/dL    eGFR If NonAfricn Am 60 >59 mL/min/1.73    eGFR If Africn Am 70 >59 mL/min/1.73    BUN/Creatinine Ratio 18 12 - 28    Sodium 141 134 - 144 mmol/L    Potassium 4.4 3.5 - 5.2 mmol/L    Chloride 104 96 - 106 mmol/L    Total CO2 28 20 - 29 mmol/L    Calcium 9.6 8.7 - 10.3 mg/dL    Narrative    Performed at:  01 - [facility] [address], [place], [place]  [id number]  : Victor Manuel Ferris MD, Phone:  [phone]

## 2021-07-08 LAB
BUN SERPL-MCNC: 17 MG/DL (ref 8–27)
BUN/CREATININE RATIO: 18 (ref 12–28)
CALCIUM SERPL-MCNC: 9.6 MG/DL (ref 8.7–10.3)
CHLORIDE SERPLBLD-SCNC: 104 MMOL/L (ref 96–106)
CREAT SERPL-MCNC: 0.94 MG/DL (ref 0.57–1)
EGFR IF AFRICN AM: 70 ML/MIN/1.73
EGFR IF NONAFRICN AM: 60 ML/MIN/1.73
GLUCOSE SERPL-MCNC: 92 MG/DL (ref 65–99)
POTASSIUM SERPL-SCNC: 4.4 MMOL/L (ref 3.5–5.2)
SODIUM SERPL-SCNC: 141 MMOL/L (ref 134–144)
TOTAL CO2: 28 MMOL/L (ref 20–29)

## 2021-07-09 DIAGNOSIS — Z11.59 ENCOUNTER FOR SCREENING FOR OTHER VIRAL DISEASES: ICD-10-CM

## 2021-07-09 LAB
LABORATORY COMMENT REPORT: NORMAL
SARS-COV-2 RNA RESP QL NAA+PROBE: NEGATIVE
SARS-COV-2 RNA RESP QL NAA+PROBE: NORMAL
SPECIMEN SOURCE: NORMAL
SPECIMEN SOURCE: NORMAL

## 2021-07-09 PROCEDURE — U0005 INFEC AGEN DETEC AMPLI PROBE: HCPCS | Performed by: ORTHOPAEDIC SURGERY

## 2021-07-09 PROCEDURE — U0003 INFECTIOUS AGENT DETECTION BY NUCLEIC ACID (DNA OR RNA); SEVERE ACUTE RESPIRATORY SYNDROME CORONAVIRUS 2 (SARS-COV-2) (CORONAVIRUS DISEASE [COVID-19]), AMPLIFIED PROBE TECHNIQUE, MAKING USE OF HIGH THROUGHPUT TECHNOLOGIES AS DESCRIBED BY CMS-2020-01-R: HCPCS | Performed by: ORTHOPAEDIC SURGERY

## 2021-07-12 ENCOUNTER — ANESTHESIA EVENT (OUTPATIENT)
Dept: SURGERY | Facility: CLINIC | Age: 71
End: 2021-07-12
Payer: MEDICARE

## 2021-07-12 RX ORDER — ALENDRONATE SODIUM 70 MG/1
70 TABLET ORAL
COMMUNITY
End: 2021-07-26

## 2021-07-12 RX ORDER — MESALAMINE 1.2 G/1
3600 TABLET, DELAYED RELEASE ORAL
COMMUNITY
End: 2023-12-05

## 2021-07-12 RX ORDER — ROSUVASTATIN CALCIUM 5 MG/1
5 TABLET, COATED ORAL EVERY EVENING
COMMUNITY
End: 2021-07-30

## 2021-07-12 RX ORDER — SENNOSIDES 8.6 MG
1300 CAPSULE ORAL EVERY 8 HOURS PRN
COMMUNITY
End: 2021-11-30

## 2021-07-12 ASSESSMENT — LIFESTYLE VARIABLES: TOBACCO_USE: 1

## 2021-07-12 NOTE — PROGRESS NOTES
PTA medications updated by Medication Scribe prior to surgery via phone call with patient (last doses completed by Nurse)     Medication history sources: Patient, Surescripts and H&P  In the past week, patient estimated taking medication this percent of the time: Greater than 90%  Adherence assessment: N/A Not Observed    Significant changes made to the medication list:  None      Additional medication history information:   None    Medication reconciliation completed by provider prior to medication history? No    Time spent in this activity: 25 minutes    The information provided in this note is only as accurate as the sources available at the time of update(s)      Prior to Admission medications    Medication Sig Last Dose Taking? Auth Provider   acetaminophen (ACETAMINOPHEN 8 HOUR) 650 MG CR tablet Take 1,300 mg by mouth every 8 hours as needed for mild pain or fever  at PRN Yes Reported, Patient   alendronate (FOSAMAX) 70 MG tablet Take 70 mg by mouth every 7 days Thursdays 7/8/2021 at AM Yes Reported, Patient   aspirin 325 MG tablet Take 1 tablet (325 mg) by mouth daily 7/7/2021 at AM Yes Moe Moody MD   calcium gluconate 500 MG TABS tablet Take 1,000 mg by mouth 2 times daily  7/12/2021 at AM Yes Reported, Patient   Cholecalciferol (VITAMIN D3) 50 MCG (2000 UT) CHEW Take 4,000 Units by mouth daily  7/12/2021 at AM Yes Reported, Patient   mesalamine (LIALDA) 1.2 g EC tablet Take 3,600 mg by mouth daily (with breakfast) (3 X 1,200 mg) 7/12/2021 at AM Yes Reported, Patient   Mesalamine 4 GM/60ML SF ENEM Place rectally See Admin Instructions Uses every 4th  nightly 7/12/2021 at PM Yes Moe Moody MD   omeprazole (PRILOSEC) 20 MG DR capsule Take 20 mg by mouth every morning  at AM Yes Reported, Patient   polyethylene glycol (MIRALAX) powder Take 1 capful by mouth as needed   at PRN Yes Reported, Patient   rosuvastatin (CRESTOR) 5 MG tablet Take 5 mg by mouth every evening 7/12/2021 at PM Yes Reported,  Patient       Berny Shepherd, CPhT  Medication [facility]

## 2021-07-12 NOTE — ANESTHESIA PREPROCEDURE EVALUATION
Anesthesia Pre-Procedure Evaluation    Patient: Kylee Cavazos   MRN: [id number] : 1950        Preoperative Diagnosis: Primary osteoarthritis of left hip [M16.12]   Procedure : Procedure(s):  LEFT TOTAL HIP ARTHROPLASTY     Past Medical History:   Diagnosis Date     CAD (coronary artery disease)     based on CT angio multiple vessels     Collagenous colitis     Documented on colonoscopy with biopsy     Ex-smoker      Gastro-oesophageal reflux disease      History of stroke     L MCA , had embolectomy and Tpa     Hyperlipidemia LDL goal < 100      OA (osteoarthritis) of knee     ref  to [facility] 10/11// Dr MARK Tobin in      Osteopenia     fosamax started - will use until      PFO (patent foramen ovale)     no treatment recommended, but pt could choose to close     Ulcerative colitis with rectal bleeding (H) 2017    biopsy at colonoscopy - consistent with Ulcerative colitis. treated with pred and suggested Rowasa enemas, if not helpful may need immunosupression.     Unspecified cerebral artery occlusion with cerebral infarction           Past Surgical History:   Procedure Laterality Date     ARTHROPLASTY KNEE  2014    Procedure: ARTHROPLASTY KNEE;  Surgeon: Brian Tobin MD;  Location:  OR     ARTHROPLASTY KNEE Right 2014    Procedure: ARTHROPLASTY KNEE;  Surgeon: Brian Tobni MD;  Location:  OR     DENTAL SURGERY Left 2017    left dental implant     JOINT REPLACEMENT, HIP RT/LT Right     Joint Replacement Hip RT/LT      No Known Allergies   Social History     Tobacco Use     Smoking status: Former Smoker     Packs/day: 0.50     Years: 35.00     Pack years: 17.50     Types: Cigarettes     Quit date: 2009     Years since quittin.2     Smokeless tobacco: Never Used   Substance Use Topics     Alcohol use: Yes     Alcohol/week: 5.0 standard drinks     Types: 5 Standard drinks or equivalent per week      Wt Readings from Last 1  Encounters:   07/06/21 94.1 kg (207 lb 6 oz)        Anesthesia Evaluation   Pt has had prior anesthetic.     No history of anesthetic complications       ROS/MED HX  ENT/Pulmonary:     (+) tobacco use, Past use,  (-) sleep apnea   Neurologic:     (+) CVA,     Cardiovascular:     (+) Dyslipidemia --CAD ---congenital heart disease PFO.     METS/Exercise Tolerance:     Hematologic:       Musculoskeletal:   (+) arthritis,     GI/Hepatic: Comment: ULCERATIVE PROCTITIS    (+) GERD, Asymptomatic on medication,     Renal/Genitourinary:  - neg Renal ROS     Endo:     (+) Obesity,     Psychiatric/Substance Use:       Infectious Disease:       Malignancy:       Other:            Physical Exam    Airway        Mallampati: II   TM distance: > 3 FB   Neck ROM: full   Mouth opening: > 3 cm    Respiratory Devices and Support         Dental  no notable dental history         Cardiovascular   cardiovascular exam normal          Pulmonary   pulmonary exam normal                OUTSIDE LABS:  CBC:   Lab Results   Component Value Date    WBC 4.9 07/06/2021    WBC 3.8 11/18/2020    HGB 14.1 07/06/2021    HGB 14.4 11/18/2020    HCT 41.2 07/06/2021    HCT 45.5 11/18/2020     07/06/2021     11/18/2020     BMP:   Lab Results   Component Value Date     07/06/2021     11/18/2020    POTASSIUM 4.4 07/06/2021    POTASSIUM 4.4 11/18/2020    CHLORIDE 104 07/06/2021    CHLORIDE 105 11/18/2020    CO2 28 05/04/2009    CO2 27 05/03/2009    BUN 17 07/06/2021    BUN 18 07/06/2021    CR 0.94 07/06/2021    CR 1.12 (A) 06/11/2021    GLC 92 07/06/2021    GLC 76 11/18/2020     COAGS:   Lab Results   Component Value Date    PTT 25 05/02/2009    INR 1.01 05/05/2009     POC:   Lab Results   Component Value Date    BGM 99 05/01/2009     HEPATIC:   Lab Results   Component Value Date    ALBUMIN 4.3 11/18/2020    PROTTOTAL 7.0 11/18/2020    ALT 6 11/18/2020    AST 19 11/18/2020    ALKPHOS 76 11/18/2020    BILITOTAL 0.7 11/18/2020     OTHER:    Lab Results   Component Value Date    VINICIO 9.6 07/06/2021    PHOS 4.1 05/04/2009    MAG 1.8 05/04/2009    TSH 2.47 05/01/2009    CRP 0.9 05/01/2009    SED 15 05/01/2009       Anesthesia Plan    ASA Status:  3   NPO Status:  NPO Appropriate    Anesthesia Type: Spinal.              Consents    Anesthesia Plan(s) and associated risks, benefits, and realistic alternatives discussed. Questions answered and patient/representative(s) expressed understanding.     - Discussed with:  Patient         Postoperative Care    Pain management: Multi-modal analgesia.   PONV prophylaxis: Ondansetron (or other 5HT-3)     Comments:                Frederick Mclaughlin MD

## 2021-07-13 ENCOUNTER — ANESTHESIA (OUTPATIENT)
Dept: SURGERY | Facility: CLINIC | Age: 71
End: 2021-07-13
Payer: MEDICARE

## 2021-07-13 ENCOUNTER — APPOINTMENT (OUTPATIENT)
Dept: GENERAL RADIOLOGY | Facility: CLINIC | Age: 71
End: 2021-07-13
Attending: PHYSICIAN ASSISTANT
Payer: MEDICARE

## 2021-07-13 ENCOUNTER — APPOINTMENT (OUTPATIENT)
Dept: PHYSICAL THERAPY | Facility: CLINIC | Age: 71
End: 2021-07-13
Attending: ORTHOPAEDIC SURGERY
Payer: MEDICARE

## 2021-07-13 ENCOUNTER — HOSPITAL ENCOUNTER (OUTPATIENT)
Facility: CLINIC | Age: 71
Discharge: HOME | End: 2021-07-14
Attending: ORTHOPAEDIC SURGERY | Admitting: ORTHOPAEDIC SURGERY
Payer: MEDICARE

## 2021-07-13 DIAGNOSIS — Z82.79 FHX: SPINA BIFIDA: Primary | ICD-10-CM

## 2021-07-13 DIAGNOSIS — Z98.890 POST-OPERATIVE STATE: ICD-10-CM

## 2021-07-13 PROBLEM — Z96.649 S/P TOTAL HIP ARTHROPLASTY: Status: ACTIVE | Noted: 2021-07-13

## 2021-07-13 LAB
ABO/RH(D): NORMAL
ANTIBODY SCREEN: NEGATIVE
SPECIMEN EXPIRATION DATE: NORMAL

## 2021-07-13 PROCEDURE — 250N000009 HC RX 250: Performed by: NURSE ANESTHETIST, CERTIFIED REGISTERED

## 2021-07-13 PROCEDURE — 97110 THERAPEUTIC EXERCISES: CPT | Mod: GP | Performed by: PHYSICAL THERAPIST

## 2021-07-13 PROCEDURE — 370N000017 HC ANESTHESIA TECHNICAL FEE, PER MIN: Performed by: ORTHOPAEDIC SURGERY

## 2021-07-13 PROCEDURE — 258N000003 HC RX IP 258 OP 636: Performed by: ANESTHESIOLOGY

## 2021-07-13 PROCEDURE — 99207 PR CONSULT E&M CHANGED TO INITIAL LEVEL: CPT | Performed by: PHYSICIAN ASSISTANT

## 2021-07-13 PROCEDURE — 250N000011 HC RX IP 250 OP 636: Performed by: PHYSICIAN ASSISTANT

## 2021-07-13 PROCEDURE — 86900 BLOOD TYPING SEROLOGIC ABO: CPT | Performed by: ANESTHESIOLOGY

## 2021-07-13 PROCEDURE — 97161 PT EVAL LOW COMPLEX 20 MIN: CPT | Mod: GP | Performed by: PHYSICAL THERAPIST

## 2021-07-13 PROCEDURE — 36415 COLL VENOUS BLD VENIPUNCTURE: CPT | Performed by: ANESTHESIOLOGY

## 2021-07-13 PROCEDURE — 250N000011 HC RX IP 250 OP 636: Performed by: ORTHOPAEDIC SURGERY

## 2021-07-13 PROCEDURE — 272N000001 HC OR GENERAL SUPPLY STERILE: Performed by: ORTHOPAEDIC SURGERY

## 2021-07-13 PROCEDURE — 250N000011 HC RX IP 250 OP 636: Performed by: ANESTHESIOLOGY

## 2021-07-13 PROCEDURE — 999N000141 HC STATISTIC PRE-PROCEDURE NURSING ASSESSMENT: Performed by: ORTHOPAEDIC SURGERY

## 2021-07-13 PROCEDURE — C1713 ANCHOR/SCREW BN/BN,TIS/BN: HCPCS | Performed by: ORTHOPAEDIC SURGERY

## 2021-07-13 PROCEDURE — 999N000063 XR PELVIS AND HIP PORTABLE LEFT 1 VIEW

## 2021-07-13 PROCEDURE — 250N000009 HC RX 250: Performed by: ORTHOPAEDIC SURGERY

## 2021-07-13 PROCEDURE — 710N000009 HC RECOVERY PHASE 1, LEVEL 1, PER MIN: Performed by: ORTHOPAEDIC SURGERY

## 2021-07-13 PROCEDURE — 360N000077 HC SURGERY LEVEL 4, PER MIN: Performed by: ORTHOPAEDIC SURGERY

## 2021-07-13 PROCEDURE — 99202 OFFICE O/P NEW SF 15 MIN: CPT | Performed by: PHYSICIAN ASSISTANT

## 2021-07-13 PROCEDURE — 250N000011 HC RX IP 250 OP 636: Performed by: NURSE ANESTHETIST, CERTIFIED REGISTERED

## 2021-07-13 PROCEDURE — 258N000003 HC RX IP 258 OP 636: Performed by: NURSE ANESTHETIST, CERTIFIED REGISTERED

## 2021-07-13 PROCEDURE — 97116 GAIT TRAINING THERAPY: CPT | Mod: GP | Performed by: PHYSICAL THERAPIST

## 2021-07-13 PROCEDURE — 258N000003 HC RX IP 258 OP 636: Performed by: PHYSICIAN ASSISTANT

## 2021-07-13 PROCEDURE — 278N000051 HC OR IMPLANT GENERAL: Performed by: ORTHOPAEDIC SURGERY

## 2021-07-13 PROCEDURE — C1776 JOINT DEVICE (IMPLANTABLE): HCPCS | Performed by: ORTHOPAEDIC SURGERY

## 2021-07-13 PROCEDURE — 250N000013 HC RX MED GY IP 250 OP 250 PS 637: Performed by: PHYSICIAN ASSISTANT

## 2021-07-13 DEVICE — IMP SHELL SNR ACET R3 3H 52MM 71335552: Type: IMPLANTABLE DEVICE | Site: HIP | Status: FUNCTIONAL

## 2021-07-13 DEVICE — IMP LINER SNR ACET R3 XLPE 0DEG 36X52MM 71332752: Type: IMPLANTABLE DEVICE | Site: HIP | Status: FUNCTIONAL

## 2021-07-13 DEVICE — IMP SCR ACET SNN SPHERICAL HEAD 6.5X30MM 71332530: Type: IMPLANTABLE DEVICE | Site: HIP | Status: FUNCTIONAL

## 2021-07-13 DEVICE — IMP HOLE COVER SNN ACETAB CUP REFLEC INTERFIT 71336500: Type: IMPLANTABLE DEVICE | Site: HIP | Status: FUNCTIONAL

## 2021-07-13 DEVICE — IMP HEAD FEMORAL SNR OXINIUM 12/14 36MM +0 71343600: Type: IMPLANTABLE DEVICE | Site: HIP | Status: FUNCTIONAL

## 2021-07-13 DEVICE — IMPLANTABLE DEVICE: Type: IMPLANTABLE DEVICE | Site: HIP | Status: FUNCTIONAL

## 2021-07-13 RX ORDER — CELECOXIB 200 MG/1
200 CAPSULE ORAL DAILY
Qty: 40 CAPSULE | Refills: 0 | Status: SHIPPED | OUTPATIENT
Start: 2021-07-13 | End: 2021-11-30

## 2021-07-13 RX ORDER — SODIUM CHLORIDE, SODIUM LACTATE, POTASSIUM CHLORIDE, CALCIUM CHLORIDE 600; 310; 30; 20 MG/100ML; MG/100ML; MG/100ML; MG/100ML
INJECTION, SOLUTION INTRAVENOUS CONTINUOUS
Status: DISCONTINUED | OUTPATIENT
Start: 2021-07-13 | End: 2021-07-14 | Stop reason: HOSPADM

## 2021-07-13 RX ORDER — AMOXICILLIN 250 MG
1-2 CAPSULE ORAL 2 TIMES DAILY
Qty: 100 TABLET | Refills: 0 | Status: SHIPPED | OUTPATIENT
Start: 2021-07-13 | End: 2021-11-30

## 2021-07-13 RX ORDER — ACETAMINOPHEN 325 MG/1
975 TABLET ORAL ONCE
Status: COMPLETED | OUTPATIENT
Start: 2021-07-13 | End: 2021-07-13

## 2021-07-13 RX ORDER — MAGNESIUM HYDROXIDE 1200 MG/15ML
LIQUID ORAL PRN
Status: DISCONTINUED | OUTPATIENT
Start: 2021-07-13 | End: 2021-07-13 | Stop reason: HOSPADM

## 2021-07-13 RX ORDER — PROPOFOL 10 MG/ML
INJECTION, EMULSION INTRAVENOUS CONTINUOUS PRN
Status: DISCONTINUED | OUTPATIENT
Start: 2021-07-13 | End: 2021-07-13

## 2021-07-13 RX ORDER — MESALAMINE 1.2 G/1
3600 TABLET, DELAYED RELEASE ORAL
Status: DISCONTINUED | OUTPATIENT
Start: 2021-07-14 | End: 2021-07-14 | Stop reason: HOSPADM

## 2021-07-13 RX ORDER — CEFAZOLIN SODIUM 2 G/100ML
2 INJECTION, SOLUTION INTRAVENOUS EVERY 8 HOURS
Status: COMPLETED | OUTPATIENT
Start: 2021-07-13 | End: 2021-07-14

## 2021-07-13 RX ORDER — HYDROXYZINE HYDROCHLORIDE 10 MG/1
10 TABLET, FILM COATED ORAL EVERY 6 HOURS PRN
Status: DISCONTINUED | OUTPATIENT
Start: 2021-07-13 | End: 2021-07-14 | Stop reason: HOSPADM

## 2021-07-13 RX ORDER — DIPHENHYDRAMINE HYDROCHLORIDE 50 MG/ML
12.5 INJECTION INTRAMUSCULAR; INTRAVENOUS EVERY 6 HOURS PRN
Status: DISCONTINUED | OUTPATIENT
Start: 2021-07-13 | End: 2021-07-13 | Stop reason: HOSPADM

## 2021-07-13 RX ORDER — OXYCODONE HYDROCHLORIDE 5 MG/1
10 TABLET ORAL EVERY 4 HOURS PRN
Status: DISCONTINUED | OUTPATIENT
Start: 2021-07-13 | End: 2021-07-14 | Stop reason: HOSPADM

## 2021-07-13 RX ORDER — DIPHENHYDRAMINE HCL 12.5MG/5ML
12.5 LIQUID (ML) ORAL EVERY 6 HOURS PRN
Status: DISCONTINUED | OUTPATIENT
Start: 2021-07-13 | End: 2021-07-14 | Stop reason: HOSPADM

## 2021-07-13 RX ORDER — HYDRALAZINE HYDROCHLORIDE 20 MG/ML
2.5-5 INJECTION INTRAMUSCULAR; INTRAVENOUS EVERY 10 MIN PRN
Status: DISCONTINUED | OUTPATIENT
Start: 2021-07-13 | End: 2021-07-13 | Stop reason: HOSPADM

## 2021-07-13 RX ORDER — PROPOFOL 10 MG/ML
INJECTION, EMULSION INTRAVENOUS PRN
Status: DISCONTINUED | OUTPATIENT
Start: 2021-07-13 | End: 2021-07-13

## 2021-07-13 RX ORDER — CEFAZOLIN SODIUM 2 G/100ML
2 INJECTION, SOLUTION INTRAVENOUS
Status: COMPLETED | OUTPATIENT
Start: 2021-07-13 | End: 2021-07-13

## 2021-07-13 RX ORDER — DIMENHYDRINATE 50 MG/ML
25 INJECTION, SOLUTION INTRAMUSCULAR; INTRAVENOUS
Status: DISCONTINUED | OUTPATIENT
Start: 2021-07-13 | End: 2021-07-13 | Stop reason: HOSPADM

## 2021-07-13 RX ORDER — NALOXONE HYDROCHLORIDE 0.4 MG/ML
0.2 INJECTION, SOLUTION INTRAMUSCULAR; INTRAVENOUS; SUBCUTANEOUS
Status: DISCONTINUED | OUTPATIENT
Start: 2021-07-13 | End: 2021-07-14 | Stop reason: HOSPADM

## 2021-07-13 RX ORDER — ONDANSETRON 4 MG/1
4 TABLET, ORALLY DISINTEGRATING ORAL EVERY 6 HOURS PRN
Status: DISCONTINUED | OUTPATIENT
Start: 2021-07-13 | End: 2021-07-14 | Stop reason: HOSPADM

## 2021-07-13 RX ORDER — CELECOXIB 200 MG/1
400 CAPSULE ORAL ONCE
Status: COMPLETED | OUTPATIENT
Start: 2021-07-13 | End: 2021-07-13

## 2021-07-13 RX ORDER — BISACODYL 10 MG
10 SUPPOSITORY, RECTAL RECTAL DAILY PRN
Status: DISCONTINUED | OUTPATIENT
Start: 2021-07-13 | End: 2021-07-14 | Stop reason: HOSPADM

## 2021-07-13 RX ORDER — OXYCODONE HYDROCHLORIDE 5 MG/1
5 TABLET ORAL EVERY 4 HOURS PRN
Status: DISCONTINUED | OUTPATIENT
Start: 2021-07-13 | End: 2021-07-14 | Stop reason: HOSPADM

## 2021-07-13 RX ORDER — HYDROXYZINE HYDROCHLORIDE 10 MG/1
10 TABLET, FILM COATED ORAL EVERY 6 HOURS PRN
Qty: 30 TABLET | Refills: 0 | Status: SHIPPED | OUTPATIENT
Start: 2021-07-13 | End: 2021-11-30

## 2021-07-13 RX ORDER — ONDANSETRON 2 MG/ML
4 INJECTION INTRAMUSCULAR; INTRAVENOUS EVERY 30 MIN PRN
Status: DISCONTINUED | OUTPATIENT
Start: 2021-07-13 | End: 2021-07-13 | Stop reason: HOSPADM

## 2021-07-13 RX ORDER — ONDANSETRON 2 MG/ML
INJECTION INTRAMUSCULAR; INTRAVENOUS PRN
Status: DISCONTINUED | OUTPATIENT
Start: 2021-07-13 | End: 2021-07-13

## 2021-07-13 RX ORDER — NALOXONE HYDROCHLORIDE 0.4 MG/ML
0.4 INJECTION, SOLUTION INTRAMUSCULAR; INTRAVENOUS; SUBCUTANEOUS
Status: DISCONTINUED | OUTPATIENT
Start: 2021-07-13 | End: 2021-07-14 | Stop reason: HOSPADM

## 2021-07-13 RX ORDER — ACETAMINOPHEN 325 MG/1
650 TABLET ORAL EVERY 4 HOURS PRN
Qty: 100 TABLET | Refills: 0 | Status: SHIPPED | OUTPATIENT
Start: 2021-07-13

## 2021-07-13 RX ORDER — MEPERIDINE HYDROCHLORIDE 25 MG/ML
12.5 INJECTION INTRAMUSCULAR; INTRAVENOUS; SUBCUTANEOUS EVERY 5 MIN PRN
Status: DISCONTINUED | OUTPATIENT
Start: 2021-07-13 | End: 2021-07-13 | Stop reason: HOSPADM

## 2021-07-13 RX ORDER — DIPHENHYDRAMINE HCL 12.5MG/5ML
12.5 LIQUID (ML) ORAL EVERY 6 HOURS PRN
Status: DISCONTINUED | OUTPATIENT
Start: 2021-07-13 | End: 2021-07-13 | Stop reason: HOSPADM

## 2021-07-13 RX ORDER — VANCOMYCIN HYDROCHLORIDE 1 G/20ML
INJECTION, POWDER, LYOPHILIZED, FOR SOLUTION INTRAVENOUS PRN
Status: DISCONTINUED | OUTPATIENT
Start: 2021-07-13 | End: 2021-07-13 | Stop reason: HOSPADM

## 2021-07-13 RX ORDER — OXYCODONE HYDROCHLORIDE 5 MG/1
5-10 TABLET ORAL
Qty: 50 TABLET | Refills: 0 | Status: SHIPPED | OUTPATIENT
Start: 2021-07-13 | End: 2021-11-30

## 2021-07-13 RX ORDER — PROCHLORPERAZINE MALEATE 5 MG
5 TABLET ORAL EVERY 6 HOURS PRN
Status: DISCONTINUED | OUTPATIENT
Start: 2021-07-13 | End: 2021-07-14 | Stop reason: HOSPADM

## 2021-07-13 RX ORDER — SODIUM CHLORIDE, SODIUM LACTATE, POTASSIUM CHLORIDE, CALCIUM CHLORIDE 600; 310; 30; 20 MG/100ML; MG/100ML; MG/100ML; MG/100ML
INJECTION, SOLUTION INTRAVENOUS CONTINUOUS
Status: DISCONTINUED | OUTPATIENT
Start: 2021-07-13 | End: 2021-07-13 | Stop reason: HOSPADM

## 2021-07-13 RX ORDER — AMOXICILLIN 250 MG
1 CAPSULE ORAL 2 TIMES DAILY
Status: DISCONTINUED | OUTPATIENT
Start: 2021-07-13 | End: 2021-07-14 | Stop reason: HOSPADM

## 2021-07-13 RX ORDER — HYDROMORPHONE HCL IN WATER/PF 6 MG/30 ML
0.4 PATIENT CONTROLLED ANALGESIA SYRINGE INTRAVENOUS
Status: DISCONTINUED | OUTPATIENT
Start: 2021-07-13 | End: 2021-07-14 | Stop reason: HOSPADM

## 2021-07-13 RX ORDER — DEXAMETHASONE SODIUM PHOSPHATE 10 MG/ML
4 INJECTION, SOLUTION INTRAMUSCULAR; INTRAVENOUS
Status: DISCONTINUED | OUTPATIENT
Start: 2021-07-13 | End: 2021-07-13 | Stop reason: HOSPADM

## 2021-07-13 RX ORDER — CEFAZOLIN SODIUM 2 G/100ML
2 INJECTION, SOLUTION INTRAVENOUS SEE ADMIN INSTRUCTIONS
Status: DISCONTINUED | OUTPATIENT
Start: 2021-07-13 | End: 2021-07-13 | Stop reason: HOSPADM

## 2021-07-13 RX ORDER — LIDOCAINE HYDROCHLORIDE 20 MG/ML
INJECTION, SOLUTION INFILTRATION; PERINEURAL PRN
Status: DISCONTINUED | OUTPATIENT
Start: 2021-07-13 | End: 2021-07-13

## 2021-07-13 RX ORDER — LIDOCAINE 40 MG/G
CREAM TOPICAL
Status: DISCONTINUED | OUTPATIENT
Start: 2021-07-13 | End: 2021-07-14 | Stop reason: HOSPADM

## 2021-07-13 RX ORDER — POLYETHYLENE GLYCOL 3350 17 G/17G
17 POWDER, FOR SOLUTION ORAL DAILY
Status: DISCONTINUED | OUTPATIENT
Start: 2021-07-14 | End: 2021-07-14 | Stop reason: HOSPADM

## 2021-07-13 RX ORDER — ONDANSETRON 2 MG/ML
4 INJECTION INTRAMUSCULAR; INTRAVENOUS EVERY 6 HOURS PRN
Status: DISCONTINUED | OUTPATIENT
Start: 2021-07-13 | End: 2021-07-14 | Stop reason: HOSPADM

## 2021-07-13 RX ORDER — ALBUTEROL SULFATE 0.83 MG/ML
2.5 SOLUTION RESPIRATORY (INHALATION) EVERY 4 HOURS PRN
Status: DISCONTINUED | OUTPATIENT
Start: 2021-07-13 | End: 2021-07-13 | Stop reason: HOSPADM

## 2021-07-13 RX ORDER — ONDANSETRON 4 MG/1
4 TABLET, ORALLY DISINTEGRATING ORAL EVERY 30 MIN PRN
Status: DISCONTINUED | OUTPATIENT
Start: 2021-07-13 | End: 2021-07-13 | Stop reason: HOSPADM

## 2021-07-13 RX ORDER — HYDROMORPHONE HCL IN WATER/PF 6 MG/30 ML
0.2 PATIENT CONTROLLED ANALGESIA SYRINGE INTRAVENOUS
Status: DISCONTINUED | OUTPATIENT
Start: 2021-07-13 | End: 2021-07-14 | Stop reason: HOSPADM

## 2021-07-13 RX ORDER — DEXAMETHASONE SODIUM PHOSPHATE 4 MG/ML
INJECTION, SOLUTION INTRA-ARTICULAR; INTRALESIONAL; INTRAMUSCULAR; INTRAVENOUS; SOFT TISSUE PRN
Status: DISCONTINUED | OUTPATIENT
Start: 2021-07-13 | End: 2021-07-13

## 2021-07-13 RX ORDER — ACETAMINOPHEN 325 MG/1
975 TABLET ORAL EVERY 8 HOURS
Status: DISCONTINUED | OUTPATIENT
Start: 2021-07-13 | End: 2021-07-14 | Stop reason: HOSPADM

## 2021-07-13 RX ORDER — TRANEXAMIC ACID 650 MG/1
1950 TABLET ORAL ONCE
Status: COMPLETED | OUTPATIENT
Start: 2021-07-13 | End: 2021-07-13

## 2021-07-13 RX ORDER — EPHEDRINE SULFATE 50 MG/ML
INJECTION, SOLUTION INTRAMUSCULAR; INTRAVENOUS; SUBCUTANEOUS PRN
Status: DISCONTINUED | OUTPATIENT
Start: 2021-07-13 | End: 2021-07-13

## 2021-07-13 RX ORDER — FENTANYL CITRATE 50 UG/ML
50 INJECTION, SOLUTION INTRAMUSCULAR; INTRAVENOUS EVERY 5 MIN PRN
Status: DISCONTINUED | OUTPATIENT
Start: 2021-07-13 | End: 2021-07-13 | Stop reason: HOSPADM

## 2021-07-13 RX ORDER — CELECOXIB 100 MG/1
100 CAPSULE ORAL 2 TIMES DAILY
Status: DISCONTINUED | OUTPATIENT
Start: 2021-07-13 | End: 2021-07-14 | Stop reason: HOSPADM

## 2021-07-13 RX ADMIN — MIDAZOLAM 1 MG: 1 INJECTION INTRAMUSCULAR; INTRAVENOUS at 08:04

## 2021-07-13 RX ADMIN — HYDROMORPHONE HYDROCHLORIDE 0.2 MG: 0.2 INJECTION, SOLUTION INTRAMUSCULAR; INTRAVENOUS; SUBCUTANEOUS at 15:05

## 2021-07-13 RX ADMIN — SODIUM CHLORIDE, POTASSIUM CHLORIDE, SODIUM LACTATE AND CALCIUM CHLORIDE: 600; 310; 30; 20 INJECTION, SOLUTION INTRAVENOUS at 08:00

## 2021-07-13 RX ADMIN — OXYCODONE HYDROCHLORIDE 5 MG: 5 TABLET ORAL at 15:34

## 2021-07-13 RX ADMIN — SODIUM CHLORIDE, POTASSIUM CHLORIDE, SODIUM LACTATE AND CALCIUM CHLORIDE: 600; 310; 30; 20 INJECTION, SOLUTION INTRAVENOUS at 13:04

## 2021-07-13 RX ADMIN — PROPOFOL 125 MCG/KG/MIN: 10 INJECTION, EMULSION INTRAVENOUS at 08:09

## 2021-07-13 RX ADMIN — ACETAMINOPHEN 975 MG: 325 TABLET, FILM COATED ORAL at 15:01

## 2021-07-13 RX ADMIN — Medication 5 MG: at 08:41

## 2021-07-13 RX ADMIN — SODIUM CHLORIDE, POTASSIUM CHLORIDE, SODIUM LACTATE AND CALCIUM CHLORIDE: 600; 310; 30; 20 INJECTION, SOLUTION INTRAVENOUS at 08:31

## 2021-07-13 RX ADMIN — Medication 10 MG: at 08:17

## 2021-07-13 RX ADMIN — ONDANSETRON 4 MG: 2 INJECTION INTRAMUSCULAR; INTRAVENOUS at 08:34

## 2021-07-13 RX ADMIN — Medication 5 MG: at 08:59

## 2021-07-13 RX ADMIN — FENTANYL CITRATE 50 MCG: 0.05 INJECTION, SOLUTION INTRAMUSCULAR; INTRAVENOUS at 11:37

## 2021-07-13 RX ADMIN — SODIUM CHLORIDE, POTASSIUM CHLORIDE, SODIUM LACTATE AND CALCIUM CHLORIDE: 600; 310; 30; 20 INJECTION, SOLUTION INTRAVENOUS at 15:02

## 2021-07-13 RX ADMIN — Medication 5 MG: at 09:17

## 2021-07-13 RX ADMIN — CEFAZOLIN SODIUM 2 G: 2 INJECTION, SOLUTION INTRAVENOUS at 16:30

## 2021-07-13 RX ADMIN — CELECOXIB 100 MG: 100 CAPSULE ORAL at 18:14

## 2021-07-13 RX ADMIN — TRANEXAMIC ACID 1950 MG: 650 TABLET ORAL at 06:56

## 2021-07-13 RX ADMIN — Medication 5 MG: at 08:35

## 2021-07-13 RX ADMIN — DEXAMETHASONE SODIUM PHOSPHATE 4 MG: 4 INJECTION, SOLUTION INTRA-ARTICULAR; INTRALESIONAL; INTRAMUSCULAR; INTRAVENOUS; SOFT TISSUE at 08:35

## 2021-07-13 RX ADMIN — CEFAZOLIN SODIUM 2 G: 2 INJECTION, SOLUTION INTRAVENOUS at 08:07

## 2021-07-13 RX ADMIN — PROPOFOL 30 MG: 10 INJECTION, EMULSION INTRAVENOUS at 08:09

## 2021-07-13 RX ADMIN — PHENYLEPHRINE HYDROCHLORIDE 0.2 MCG/KG/MIN: 10 INJECTION INTRAVENOUS at 08:58

## 2021-07-13 RX ADMIN — MEPIVACAINE HYDROCHLORIDE 2.5 ML: 20 INJECTION, SOLUTION EPIDURAL; INFILTRATION at 08:10

## 2021-07-13 RX ADMIN — ACETAMINOPHEN 975 MG: 325 TABLET, FILM COATED ORAL at 06:55

## 2021-07-13 RX ADMIN — ASPIRIN 325 MG: 325 TABLET, COATED ORAL at 15:02

## 2021-07-13 RX ADMIN — PHENYLEPHRINE HYDROCHLORIDE 100 MCG: 10 INJECTION INTRAVENOUS at 08:55

## 2021-07-13 RX ADMIN — OXYCODONE HYDROCHLORIDE 5 MG: 5 TABLET ORAL at 20:31

## 2021-07-13 RX ADMIN — CELECOXIB 400 MG: 200 CAPSULE ORAL at 06:55

## 2021-07-13 RX ADMIN — LIDOCAINE HYDROCHLORIDE 40 MG: 20 INJECTION, SOLUTION INFILTRATION; PERINEURAL at 08:10

## 2021-07-13 RX ADMIN — PROPOFOL 30 MG: 10 INJECTION, EMULSION INTRAVENOUS at 08:12

## 2021-07-13 RX ADMIN — PHENYLEPHRINE HYDROCHLORIDE 50 MCG: 10 INJECTION INTRAVENOUS at 08:51

## 2021-07-13 RX ADMIN — Medication 5 MG: at 08:26

## 2021-07-13 ASSESSMENT — MIFFLIN-ST. JEOR: SCORE: 1422.6

## 2021-07-13 NOTE — ANESTHESIA PROCEDURE NOTES
Intrathecal injection Procedure Note  Pre-Procedure   Staff -        Anesthesiologist:  Frederick Mclaughlin MD       Performed By: anesthesiologist       Location: OR       Pre-Anesthestic Checklist: patient identified, IV checked, site marked, risks and benefits discussed, informed consent, monitors and equipment checked, pre-op evaluation, at physician/surgeon's request and post-op pain management  Timeout:       Correct Patient: Yes        Correct Procedure: Yes        Correct Site: Yes        Correct Position: Yes   Procedure Documentation  Procedure: intrathecal injection       Patient Position: sitting       Patient Prep/Sterile Barriers: sterile gloves, mask, patient draped       Skin prep: Betadine       Insertion Site: L3-4. (midline approach).       Spinal Needle Type: Makenna-Kurt       Introducer used       Introducer: 20 G       # of attempts: 1 and  # of redirects:  0    Assessment/Narrative         Paresthesias: No.       CSF fluid: clear.    Medication(s) Administered   2% Mepivacaine PF (Intrathecal), 2.5 mL  Medication Administration Time: 7/13/2021 8:10 AM    Comments:  12.5 mg 0.75% BUPIVACAINE WITH 8.25% DEXTROSE INJECTED. No paresthesia on injection. Patient tolerated the procedure well.

## 2021-07-13 NOTE — CONSULTS
[facility]  Consult Note - Hospitalist Service     Date of Admission:  7/13/2021  Consult Requested by: Cory Villalobos PA-C  Reason for Consult: Post-op co-medical management left ADOLPH  PRIMARY CARE PROVIDER:    Carmencita Lindsay    Assessment & Plan   Kylee Cavazos is a 71 year old female admitted on 7/13/2021.    Past medical history significant for OA, Osteoporosis, HLP, GERD, Obesity, Ulcerative proctitis, History of CVA who underwent an elective left ADOLPH.      OA with degenerative changes of the left hip s/p left ADOLPH  POD# 0. Patient with previous right ADOLPH and bilateral TKA.    - Orthopedic Service is managing.   --Defer analgesic management, DVT prophylaxis, PT/OT.     --Defer resumption of ASA to Ortho.    - HGB check in the morning.    - Encourage utilization of incentive spirometer.     Osteoporosis  - Hold PTA alendronate 70 mg weekly and resume at discharge.      HLP  - Hold PTA rosuvastatin 5 mg/d and resume at discharge.      GERD  - Hold PTA omeprazole 20 mg/d and resume at discharge.      Obesity with associated LENNIE  Body mass index is 37.04 kg/m .  Increase in all-cause morbidity and mortality.   - Encourage weight loss.  - Follow up with PCP regarding ongoing management.        Ulcerative proctitis  Patient had her enema last night and is not due again until Saturday.    - Resumed on PTA Mesalamine 21368 mg/d.  - Hold every 5th day mesalamine enema and resume at discharge.      History of CVA (2009)  - Defer resumption of  mg/d to Ortho.      Diet: Advance Diet as Tolerated: Regular Diet Adult  Discharge Instruction - Regular Diet Adult     DVT Prophylaxis: Defer to primary service   [place] Catheter: Not present  Code Status: Full Code   Disposition Plan    Expected discharge: Per Ortho.    Entered: Kalia Soliz PA-C 07/13/2021, 1:27 PM        The patient's care was discussed with the Bedside Nurse and Patient.    The patient has been  discussed with Dr. Henderson, who agrees with the assessment and plan at this time. Dr. Henderson will evaluate the patient independently.     At this time, I'd like to thank Cory Villalobos PA-C for consulting the Hospitalist service.  We will continue to follow.        Kalia Soliz PA-C  [facility]  Securely message with the Vocera Web Console (learn more here)  Text page via [employer] Paging/Directory    ______________________________________________________________________    Chief Complaint   Elective Left ADOLPH    History is obtained from the patient and EMR.      History of Present Illness   Kylee Cavazos is a 71 year old female with a past medical history significant for OA, Osteoporosis, HLP, GERD, Obesity, Ulcerative proctitis, History of CVA who underwent an elective left ADOLPH.      Surgery was performed under spinal anesthesia.  EBL was documented at 75 ml.      Initially, we reviewed her medical and surgical history as well as her home medications.  She indicated that she had her mesalamine enema last night and would not need it again until Saturday.       Upon questioning, she indicated that she occasionally has palpitations.  She was having ongoing left hip pain and is hopeful that surgery will fix that.  She feels as though her full dose ASA causes her to bruise easily.  She is concerned about the timing of restarting her ASA.      Reviewed plans with patient in regards to management of colitis with resumption of oral mesalamine.  She was agreeable to holding her Crestor and omeprazole and restarting it when she returns home.      Review of Systems   The 10 point Review of Systems is negative other than noted in the HPI.    Past Medical History    I have reviewed this patient's medical history and updated it with pertinent information if needed.   Past Medical History:   Diagnosis Date     CAD (coronary artery disease)     based on CT angio multiple vessels      Collagenous colitis 2010    Documented on colonoscopy with biopsy     Ex-smoker      Gastro-oesophageal reflux disease      History of stroke 2009    L MCA , had embolectomy and Tpa     Hyperlipidemia LDL goal < 100      OA (osteoarthritis) of knee     ref  to [facility] 10/11// Dr MARK Tobin in 2014     Osteopenia     fosamax started 2011- will use until 2016     PFO (patent foramen ovale)     no treatment recommended, but pt could choose to close     Ulcerative colitis with rectal bleeding (H) 2017    biopsy at colonoscopy - consistent with Ulcerative colitis. treated with pred and suggested Rowasa enemas, if not helpful may need immunosupression.     Unspecified cerebral artery occlusion with cerebral infarction     2009   OA, Osteoporosis, HLP, GERD, Obesity, Ulcerative proctitis, History of CVA    Past Surgical History   I have reviewed this patient's surgical history and updated it with pertinent information if needed.  Past Surgical History:   Procedure Laterality Date     ARTHROPLASTY KNEE  7/8/2014    Procedure: ARTHROPLASTY KNEE;  Surgeon: Brian Tobin MD;  Location:  OR     ARTHROPLASTY KNEE Right 9/9/2014    Procedure: ARTHROPLASTY KNEE;  Surgeon: Brian Tobin MD;  Location:  OR     DENTAL SURGERY Left 08/2017    left dental implant     JOINT REPLACEMENT, HIP RT/LT Right     Joint Replacement Hip RT/LT   Carpal tunnel release bilaterally, Cataract removal and lens replacement bilaterally, left sided dental implant, Bilateral TKA, Right ADOLPH    Social History   I have reviewed this patient's social history and updated it with pertinent information if needed.  Patient resides in a house in [place], [place].  She is .  She is a former smoker who quit in 2009.  She consumes alcohol occasionally.  She does not use illicit drugs.    Social History     Tobacco Use     Smoking status: Former Smoker     Packs/day: 0.50     Years: 35.00     Pack years: 17.50     Types: Cigarettes     Quit date:  2009     Years since quittin.2     Smokeless tobacco: Never Used   Substance Use Topics     Alcohol use: Yes     Alcohol/week: 5.0 standard drinks     Types: 5 Standard drinks or equivalent per week     Drug use: No       Family History   I have reviewed this patient's family history and updated it with pertinent information if needed.   Family History   Problem Relation Age of Onset     Diabetes Mother      Hypertension Mother      C.A.D. Father      C.A.D. Brother        Medications   Prior to Admission Medications   Prescriptions Last Dose Informant Patient Reported? Taking?   Cholecalciferol (VITAMIN D3) 50 MCG (2000 UT) CHEW 2021 at AM Self Yes Yes   Sig: Take 4,000 Units by mouth daily    Mesalamine 4 GM/60ML SF ENEM 2021 at PM Self Yes Yes   Sig: Place rectally See Admin Instructions Uses every 4th  nightly   acetaminophen (ACETAMINOPHEN 8 HOUR) 650 MG CR tablet  at PRN Self Yes Yes   Sig: Take 1,300 mg by mouth every 8 hours as needed for mild pain or fever   alendronate (FOSAMAX) 70 MG tablet 2021 at AM Self Yes Yes   Sig: Take 70 mg by mouth every 7 days    aspirin 325 MG tablet 2021 at AM Self Yes Yes   Sig: Take 1 tablet (325 mg) by mouth daily   calcium gluconate 500 MG TABS tablet 2021 at AM Self Yes Yes   Sig: Take 1,000 mg by mouth 2 times daily    mesalamine (LIALDA) 1.2 g EC tablet 2021 at AM Self Yes Yes   Sig: Take 3,600 mg by mouth daily (with breakfast) (3 X 1,200 mg)   omeprazole (PRILOSEC) 20 MG DR capsule  at AM Self Yes Yes   Sig: Take 20 mg by mouth every morning   polyethylene glycol (MIRALAX) powder  at PRN Self Yes Yes   Sig: Take 1 capful by mouth as needed    rosuvastatin (CRESTOR) 5 MG tablet 2021 at PM Self Yes Yes   Sig: Take 5 mg by mouth every evening      Facility-Administered Medications: None     Allergies   No Known Allergies    Physical Exam   Temp: 97.4  F (36.3  C) Temp src: Oral BP: 130/67 Pulse: 55   Resp: 10 SpO2: 99 %  O2 Device: None (Room air) Oxygen Delivery: 2 LPM  Weight: 209 lbs 1.6 oz    Constitutional: Awake, alert, cooperative, no apparent distress.    ENT: Normocephalic, without obvious abnormality, atraumatic.  Oral exam deferred as patient is wearing a mask.  Eyes pupils are equal, round and reactive to light; extra occular movements intact.  Normal sclera.    Neck: Supple, symmetrical, trachea midline, no adenopathy.  Pulmonary: No increased work of breathing, good air exchange, clear to auscultation bilaterally, no crackles or wheezing.  Cardiovascular: Regular rate and rhythm, normal S1 and S2, no S3 or S4, and no murmur noted.  GI: Hypoactive bowel sounds, soft, non-distended, non-tender.  Obese.  Skin/Integumen: Clear.  Neuro: CN II-XII grossly intact.  Upper extremities strength, coordination and sensation intact bilaterally.  Lower extremities deferred due to post-op state.  Plantar and dorsal flexion intact and even bilaterally.    Psych:  Alert and oriented x 3. Normal affect.  Extremities: No lower extremity edema noted, and calves are non-tender to palpation bilaterally.      Data   I personally reviewed no images or EKG's today.  Results for orders placed or performed during the hospital encounter of 07/13/21 (from the past 24 hour(s))   ABO/Rh type and screen    Narrative    The following orders were created for panel order ABO/Rh type and screen.  Procedure                               Abnormality         Status                     ---------                               -----------         ------                     Adult Type and Screen[[id number]]                            Final result                 Please view results for these tests on the individual orders.   Adult Type and Screen   Result Value Ref Range    ABO/RH(D) B POS     Antibody Screen Negative Negative    SPECIMEN EXPIRATION DATE [id number]    XR Pelvis w Hip Port Left 1 View    Narrative    XR PELVIS AND HIP PORTABLE LEFT 1 VIEW  7/13/2021 10:44 AM     HISTORY: s/p left ADOLPH      Impression    IMPRESSION: Left total hip arthroplasty without apparent complication.  Right total hip arthroplasty is also noted.    MARCO DAWKINS MD         SYSTEM ID:  [id number]

## 2021-07-13 NOTE — OR NURSING
Updated patient's , Crow, via phone, patient is doing well, we are just waiting for a bed on the floor to be available.

## 2021-07-13 NOTE — PROGRESS NOTES
07/13/21 1530   Quick Adds   Type of Visit Initial PT Evaluation   Living Environment   People in home spouse   Current Living Arrangements house   Home Accessibility stairs to enter home   Number of Stairs, Main Entrance 2   Stair Railings, Main Entrance none   Transportation Anticipated family or friend will provide   Self-Care   Usual Activity Tolerance good   Current Activity Tolerance moderate   Regular Exercise No   Equipment Currently Used at Home none   Activity/Exercise/Self-Care Comment Ind with all mobiltiy and self-cares at home. Spouse Crow can assist as needed. has walker, cane, elevated toilet seat, sock aid at home.    Disability/Function   Fall history within last six months no   General Information   Onset of Illness/Injury or Date of Surgery 07/12/21   Referring Physician Cory Villalobos PA-C   Patient/Family Therapy Goals Statement (PT) home   Pertinent History of Current Problem (include personal factors and/or comorbidities that impact the POC) L ADOLPH    Existing Precautions/Restrictions no hip ER;no hip ADD past midline;90 degree hip flexion   Weight-Bearing Status - LLE weight-bearing as tolerated   Cognition   Orientation Status (Cognition) oriented x 4   Cognitive Status Comments WFL   Bed Mobility   Comment (Bed Mobility) SBA supine <> sit    Transfers   Transfer Safety Comments CGA sit <> stand with FWW    Gait/Stairs (Locomotion)   Coffey Level (Gait) contact guard   Assistive Device (Gait) walker, front-wheeled   Distance in Feet (Required for LE Total Joints) 150'    Clinical Impression   Criteria for Skilled Therapeutic Intervention yes, treatment indicated   PT Diagnosis (PT) impaired gait    Influenced by the following impairments weakness, hip pain, impaired balance    Functional limitations due to impairments fall risk, decreased activity tolerance    Clinical Presentation Stable/Uncomplicated   Clinical Presentation Rationale clinical judgement    Clinical Decision  Making (Complexity) low complexity   Therapy Frequency (PT) Daily   Predicted Duration of Therapy Intervention (days/wks) 3 days   Planned Therapy Interventions (PT) balance training;gait training;home exercise program;stair training;strengthening;transfer training   Anticipated Equipment Needs at Discharge (PT) walker, rolling   Risk & Benefits of therapy have been explained evaluation/treatment results reviewed;care plan/treatment goals reviewed;risks/benefits reviewed;current/potential barriers reviewed;participants voiced agreement with care plan;participants included;patient   PT Discharge Planning    PT Rationale for DC Rec Pt anticipates safe d/c to home with spouse assist and use of FWW for home ambulation.    PT Brief overview of current status  SBA/CGA with all mobility    Total Evaluation Time   Total Evaluation Time (Minutes) 10

## 2021-07-13 NOTE — ANESTHESIA CARE TRANSFER NOTE
"Patient: Kylee Cavazos    Procedure(s):  LEFT TOTAL HIP ARTHROPLASTY AND OPEN RIGHT HIP ABDUCTOR TENDON REPAIR    Diagnosis: Primary osteoarthritis of left hip [M16.12]  Diagnosis Additional Information: No value filed.    Anesthesia Type:   Spinal     Note:    Oropharynx: oropharynx clear of all foreign objects  Level of Consciousness: drowsy  Oxygen Supplementation: face mask    Independent Airway: airway patency satisfactory and stable  Dentition: dentition unchanged  Vital Signs Stable: post-procedure vital signs reviewed and stable  Report to RN Given: handoff report given  Patient transferred to: PACU  Comments: Vital signs:  Temp: (P) 36.5  C (97.7  F) Temp src: (P) Temporal BP: (P) 113/66 Pulse: 60   Resp: (P) 13 SpO2: 99 % O2 Device: (P) Simple face mask   Height: 160 cm (5' 3\") Weight: 94.8 kg (209 lb 1.6 oz)  Estimated body mass index is 37.04 kg/m  as calculated from the following:    Height as of this encounter: 1.6 m (5' 3\").    Weight as of this encounter: 94.8 kg (209 lb 1.6 oz).    ;s  Handoff Report: Identifed the Patient, Identified the Reponsible Provider, Reviewed the pertinent medical history, Discussed the surgical course, Reviewed Intra-OP anesthesia mangement and issues during anesthesia, Set expectations for post-procedure period and Allowed opportunity for questions and acknowledgement of understanding      Vitals: (Last set prior to Anesthesia Care Transfer)  CRNA VITALS  7/13/2021 0936 - 7/13/2021 1013      7/13/2021             Pulse:  56    SpO2:  99 %    Resp Rate (set):  10        Electronically Signed By: RANJAN Baxter CRNA  July 13, 2021  10:13 AM  "

## 2021-07-13 NOTE — PROCEDURES
DATE OF SERVICE: 7/13/2021    SURGEON   SOBIA HUERTAS MD     FIRST ASSISTANT   Stiven Surekha- NP-AC  Please bill for Mr. Irby as first assistant as there was no resident available to assist in this case. Assistants were necessary and performed positioning, draping, retraction, suturing and wound dressing tasks throughout the surgical procedure. The assistant was present for the entire procedure.    ASSISTANTS  KRYSTA Monet    PREOPERATIVE DIAGNOSIS A  End-stage left hip osteoarthritis.     POSTOPERATIVE DIAGNOSIS   End-stage left hip osteoarthritis.   Abductor tear- chronic    PROCEDURE   Left total hip arthroplasty using [employer]hew components, a size 54 cup, a size 36 mm highly cross-linked polyethylene liner, a size 9 standard offset Anthology stem and a 0 36 head.     INDICATION FOR SURGERY   Kylee Cavazos [id number] is a 71 year old-year-old female with a long history of increasing left hip pain. Preoperative x-rays showed advanced degenerative changes in the left hip. The patient has failed all reasonable non-operative options and we decided that they would benefit from the above-named procedure. Informed consent was obtained.     FINDINGS   The femoral head had completely flattened out. There was a significant amount of eburnation within the acetabulum as well. There was also significant collapse of the femoral head.     PROCEDURE   Kylee Cavazos was correctly identified by myself in the PAR and their left lower extremity was marked. The patient was taken to the operating room. They were placed under spinal anesthetic. The patient was then placed supine and leg lengths assessed. They were placed in the left lateral decubitus position with the Vernon hip-frazier. The patient was then prepped with Avagard, followed by a 10 minute Betadine scrub, alcohol paint, DuraPrep and draped in the usual fashion. A time-out was then performed. A posterior incision was made and carried down  the IT band. The IT band was then split along with the gluteal musculature. The Omni-Tract retractor was then placed, giving us excellent exposure. We placed a marking stitch at this point to measure length, as well as clinically measured length. The piriformis was identified. This was dissected with a Bovie off the back of the head, as well as with the capsule. The capsule was teed.  The Omni-Tract retractors were then placed deeper. We then dislocated the femoral head and a femoral cut was made one fingerbreadth above the lesser tuberosity. The leg was then placed in approximately 45 degrees of internal rotation and rested on a Kathleen stand.  An anterior hip retractor was placed anteriorly over the rim of the acetabulum, giving us excellent exposure. The cotyloid fossa was removed and followed by reaming medially with a 46 reamer and took this up to a 54 with excellent circumferential bleeding in the 45 degrees of abduction and approximately 15 degrees of anteversion position. The wound was then thoroughly irrigated. We pounded the permanent cup in place and I placed 2 screws and a centerhole cover. The permanent liner was also placed at this time. A Ray-Debra sponge was placed in the acetabulum. Attention was turned to the femur. The femur was placed perpendicular to the floor. The , followed by a T-handle reamer was then used to open up the femoral canal. A rat-tailed rasp was then used to identify the lateral cortex of the femur. The broaching was started at a zero and taken up to a 9. I lateralized this with a lateralizing broach as much as possible, keeping in line with the patient's natural anteversion. The Calcar reamer was then utilized. The Ray-debra sponge was removed. We then trialed the head. It was felt that a 0 was the right length and the patient was stable in all ranges of motion. We used the marking stitch to assess leg length, as well as clinical leg lengths, both appeared to be equal. The  trial components were removed. The wound and femoral canal were thoroughly irrigated. About a third of the vancomycin powder was placed within the femoral canal. The permanent 9 stem was impacted into place. The permanent trunion was then thoroughly irrigated, dried and cleaned. The permanent size 36 head was impacted in place. The hip was reduced. The short external rotators and piriformis were repaired back to the posterior femur using a #5 FiberWire x3 through bone tunnels.   The Fiberwires were passed thru the gluteus medius and pulled back to the trochanter.  The rest of the vancomycin powder was placed in the wound and the IT band was closed with a running #2 Quill; 0 Stratafix and 2-0 Stratafix running sutures were used to close the subdermal fatty and subdermal layers respectively, and the skin was closed with a running 3-0 Quill suture. The Prineo dressing was applied. There were no complications. Sponge and needle counts were correct. The patient was taken to the PAR in stable condition.     SPECIMENS  None    COMPLICATIONS  None    ESTIMATED BLOOD LOSS  75 mL    CONDITION ON DISCHARGE FROM OR  Satisfactory    PLAN  1. Antibiotic Prophylaxis was given included Ancef 2 gm. Antibiotics given within 1 hour of surgical incision and discontinued within 24 hrs.   2. DVT prophylaxis ASA given within 24 hours    3. Weight Bearing WBAT (Weight bearing as tolerated).   4. Discharge anticipated date POD# 1 to Home  5. Pain Medication oxycodone, Tylenol and Celebrex  6. Leave Prineo dressing on upon discharge if applied in OR. If normal dressing, remove on POD# 1 and apply AG dressing for discharge.  7. Routine posterior-approach hip precautions for 6 weeks- no external rotation, no adduction past midline, no hip flexion past 90 degrees.      SOBIA HUERTAS MD    @C(1)@ [facility] - [phone]

## 2021-07-13 NOTE — PROGRESS NOTES
Patient vital signs are at baseline: No, Reason: SBP in 100s  Patient able to ambulate as they were prior to admission or with assist devices provided by therapies during their stay:  No,  Reason:  PT at 1530  Patient MUST void prior to discharge:  Yes  Patient able to tolerate oral intake:  Yes  Pain has adequate pain control using Oral analgesics:  Pre-medicated w/ Oxycodone and Tylenol prior to PT

## 2021-07-13 NOTE — ANESTHESIA POSTPROCEDURE EVALUATION
Patient: Kylee Cavazos    Procedure(s):  LEFT TOTAL HIP ARTHROPLASTY AND OPEN RIGHT HIP ABDUCTOR TENDON REPAIR    Diagnosis:Primary osteoarthritis of left hip [M16.12]  Diagnosis Additional Information: No value filed.    Anesthesia Type:  Spinal    Note:  Disposition: Admission   Postop Pain Control: Uneventful            Sign Out: Well controlled pain   PONV: No   Neuro/Psych: Uneventful            Sign Out: Acceptable/Baseline neuro status   Airway/Respiratory: Uneventful            Sign Out: Acceptable/Baseline resp. status   CV/Hemodynamics: Uneventful            Sign Out: Acceptable CV status; No obvious hypovolemia; No obvious fluid overload   Other NRE: NONE   DID A NON-ROUTINE EVENT OCCUR? No           Last vitals:  Vitals:    07/13/21 0726 07/13/21 1010   BP: (!) 144/84 (P) 113/66   Pulse: 59 60   Resp: 18 (P) 13   Temp: 36.6  C (97.8  F) (P) 36.5  C (97.7  F)   SpO2: 97% 99%       Last vitals prior to Anesthesia Care Transfer:  CRNA VITALS  7/13/2021 0936 - 7/13/2021 1017      7/13/2021             Pulse:  56    SpO2:  99 %    Resp Rate (set):  10          Electronically Signed By: Frederick Mclaughlin MD  July 13, 2021  10:17 AM

## 2021-07-14 ENCOUNTER — APPOINTMENT (OUTPATIENT)
Dept: PHYSICAL THERAPY | Facility: CLINIC | Age: 71
End: 2021-07-14
Attending: PHYSICIAN ASSISTANT
Payer: MEDICARE

## 2021-07-14 ENCOUNTER — APPOINTMENT (OUTPATIENT)
Dept: OCCUPATIONAL THERAPY | Facility: CLINIC | Age: 71
End: 2021-07-14
Attending: PHYSICIAN ASSISTANT
Payer: MEDICARE

## 2021-07-14 VITALS
WEIGHT: 209.1 LBS | RESPIRATION RATE: 16 BRPM | SYSTOLIC BLOOD PRESSURE: 124 MMHG | OXYGEN SATURATION: 98 % | DIASTOLIC BLOOD PRESSURE: 64 MMHG | HEIGHT: 63 IN | BODY MASS INDEX: 37.05 KG/M2 | HEART RATE: 64 BPM | TEMPERATURE: 97.8 F

## 2021-07-14 LAB
ANION GAP SERPL CALCULATED.3IONS-SCNC: 5 MMOL/L (ref 3–14)
BUN SERPL-MCNC: 15 MG/DL (ref 7–30)
CALCIUM SERPL-MCNC: 8.6 MG/DL (ref 8.5–10.1)
CHLORIDE BLD-SCNC: 108 MMOL/L (ref 94–109)
CO2 SERPL-SCNC: 25 MMOL/L (ref 20–32)
CREAT SERPL-MCNC: 0.87 MG/DL (ref 0.52–1.04)
GFR SERPL CREATININE-BSD FRML MDRD: 66 ML/MIN/1.73M2
GLUCOSE BLD-MCNC: 113 MG/DL (ref 70–99)
HGB BLD-MCNC: 10.6 G/DL (ref 11.7–15.7)
POTASSIUM BLD-SCNC: 4 MMOL/L (ref 3.4–5.3)
SODIUM SERPL-SCNC: 138 MMOL/L (ref 133–144)

## 2021-07-14 PROCEDURE — 97165 OT EVAL LOW COMPLEX 30 MIN: CPT | Mod: GO | Performed by: OCCUPATIONAL THERAPIST

## 2021-07-14 PROCEDURE — 250N000011 HC RX IP 250 OP 636: Performed by: PHYSICIAN ASSISTANT

## 2021-07-14 PROCEDURE — 80048 BASIC METABOLIC PNL TOTAL CA: CPT | Performed by: PHYSICIAN ASSISTANT

## 2021-07-14 PROCEDURE — 250N000013 HC RX MED GY IP 250 OP 250 PS 637: Performed by: PHYSICIAN ASSISTANT

## 2021-07-14 PROCEDURE — 99207 PR CDG-CODE CATEGORY CHANGED: CPT | Performed by: INTERNAL MEDICINE

## 2021-07-14 PROCEDURE — 36415 COLL VENOUS BLD VENIPUNCTURE: CPT | Performed by: PHYSICIAN ASSISTANT

## 2021-07-14 PROCEDURE — 97116 GAIT TRAINING THERAPY: CPT | Mod: GP

## 2021-07-14 PROCEDURE — 85018 HEMOGLOBIN: CPT | Performed by: PHYSICIAN ASSISTANT

## 2021-07-14 PROCEDURE — 99213 OFFICE O/P EST LOW 20 MIN: CPT | Performed by: INTERNAL MEDICINE

## 2021-07-14 PROCEDURE — 97535 SELF CARE MNGMENT TRAINING: CPT | Mod: GO | Performed by: OCCUPATIONAL THERAPIST

## 2021-07-14 RX ADMIN — ACETAMINOPHEN 975 MG: 325 TABLET, FILM COATED ORAL at 06:52

## 2021-07-14 RX ADMIN — OXYCODONE HYDROCHLORIDE 5 MG: 5 TABLET ORAL at 00:37

## 2021-07-14 RX ADMIN — ACETAMINOPHEN 975 MG: 325 TABLET, FILM COATED ORAL at 00:36

## 2021-07-14 RX ADMIN — OXYCODONE HYDROCHLORIDE 10 MG: 5 TABLET ORAL at 06:53

## 2021-07-14 RX ADMIN — CEFAZOLIN SODIUM 2 G: 2 INJECTION, SOLUTION INTRAVENOUS at 00:36

## 2021-07-14 RX ADMIN — OXYCODONE HYDROCHLORIDE 10 MG: 5 TABLET ORAL at 11:13

## 2021-07-14 RX ADMIN — HYDROXYZINE HYDROCHLORIDE 10 MG: 10 TABLET ORAL at 05:33

## 2021-07-14 RX ADMIN — ASPIRIN 325 MG: 325 TABLET, COATED ORAL at 08:25

## 2021-07-14 RX ADMIN — MESALAMINE 3600 MG: 1.2 TABLET, DELAYED RELEASE ORAL at 08:25

## 2021-07-14 RX ADMIN — CELECOXIB 100 MG: 100 CAPSULE ORAL at 08:25

## 2021-07-14 ASSESSMENT — ACTIVITIES OF DAILY LIVING (ADL): PREVIOUS_RESPONSIBILITIES: MEAL PREP;HOUSEKEEPING;LAUNDRY;SHOPPING;YARDWORK;MEDICATION MANAGEMENT;FINANCES;DRIVING

## 2021-07-14 NOTE — PROGRESS NOTES
"ORTHOPEDIC LOWER EXTREMITY PROGRESS NOTE    POD#1  Patient is a 71 year old female who underwent Procedure(s):  LEFT TOTAL HIP ARTHROPLASTY AND OPEN RIGHT HIP ABDUCTOR TENDON REPAIR on 2021 on left . Pain is controlled. Tolerating medication well with no nausea or vomiting.   is here with her this morning.  No chest pain or shortness of breath.  Discharge instructions reviewed.    Vitals:   Blood pressure 124/64, pulse 64, temperature 97.8  F (36.6  C), temperature source Oral, resp. rate 16, height 1.6 m (5' 3\"), weight 94.8 kg (209 lb 1.6 oz), SpO2 98 %, not currently breastfeeding.  Temp (24hrs), Av.5  F (36.4  C), Min:96.9  F (36.1  C), Max:98.1  F (36.7  C)        Drains: none    EXAM   The patient is alert description: awake and alert.  Calves are soft and non-tender.   Sensation is intact.  Dorsiflexion and plantar flexion is intact.  Foot warm with nl cap refill.  The incision is incision: covered and with prineo dressing.     Labs:   Recent Labs   Lab Test 21  0701 21  1630 20  0000   HGB 10.6* 14.1 14.4     ASSESSMENT  S/p LEFT TOTAL HIP ARTHROPLASTY AND OPEN RIGHT HIP ABDUCTOR TENDON REPAIR  PLAN  1. DVT prophylaxis: anticoagulants: aspirin  2. Weight Bearing: weightbearing: WBAT (Weight bearing as tolerated).  3. Anticipated discharge date 2021 . Discharge to discharge destination: Home with No Skilled Service.  4. Cont Pain Control pain medication: Oxycodone and Tylenol    Mary Cohen PA-C  [facility]        "

## 2021-07-14 NOTE — PROGRESS NOTES
Patient vital signs are at baseline: Yes  Patient able to ambulate as they were prior to admission or with assist devices provided by therapies during their stay:  Yes  Patient MUST void prior to discharge:  Yes  Patient able to tolerate oral intake:  Yes  Pain has adequate pain control using Oral analgesics:  Yes     Pt a&ox4, vss on ra, up with 1 gb and walker, regular diet, oxycodone, atarax and tylenol for pain, voiding adequately in BR, cms intact, dressing cdi, plan to discharge home today.

## 2021-07-14 NOTE — PROGRESS NOTES
07/14/21 0800   Quick Adds   Type of Visit Initial Occupational Therapy Evaluation   Living Environment   People in home spouse   Current Living Arrangements house   Home Accessibility stairs to enter home   Number of Stairs, Main Entrance 2   Stair Railings, Main Entrance none   Transportation Anticipated family or friend will provide   Living Environment Comments All needs met on main level. Tub shower, grab bars at entrance. Comfort height toilet with grab bars   Self-Care   Usual Activity Tolerance good   Current Activity Tolerance moderate   Regular Exercise No   Equipment Currently Used at Home none  (pt has toilet safety frame, reacher, sock aid)   Activity/Exercise/Self-Care Comment Pt reports independence with I/ADLs at home, has had two TKA and R ADOLPH previously. Spouse able to assist as needed at discharge   Instrumental Activities of Daily Living (IADL)   Previous Responsibilities meal prep;housekeeping;laundry;shopping;yardwork;medication management;finances;driving   Disability/Function   Concentrating, Remembering or Making Decisions Difficulty no   Walking or Climbing Stairs Difficulty no   Dressing/Bathing Difficulty no   Toileting issues no   Fall history within last six months no   General Information   Onset of Illness/Injury or Date of Surgery 07/13/21   Referring Physician Cory Villalobos PA-C   Additional Occupational Profile Info/Pertinent History of Current Problem POD #1 L ADOLPH   Existing Precautions/Restrictions fall;no hip ER;no hip ADD past midline;90 degree hip flexion   Left Lower Extremity (Weight-bearing Status) weight-bearing as tolerated (WBAT)   Cognitive Status Examination   Orientation Status orientation to person, place and time   Affect/Mental Status (Cognitive) WFL   Follows Commands WFL   Visual Perception   Visual Impairment/Limitations WFL   Sensory   Sensory Quick Adds No deficits were identified   Pain Assessment   Patient Currently in Pain Yes, see Vital Sign  flowsheet   Posture   Posture protracted shoulders   Range of Motion Comprehensive   General Range of Motion bilateral upper extremity ROM WFL   Comment, General Range of Motion L LE limited s/p ADOLPH. Pt unable to achieve figure 4 with R LE   Strength Comprehensive (MMT)   Comment, General Manual Muscle Testing (MMT) Assessment L LE limited s/p ADOLPH   Muscle Tone Assessment   Muscle Tone Quick Adds No deficits were identified   Bed Mobility   Bed Mobility supine-sit;sit-supine   Supine-Sit [place] (Bed Mobility) supervision   Sit-Supine [place] (Bed Mobility) minimum assist (75% patient effort)   Assistive Device (Bed Mobility) bed rails   Transfers   Transfers sit-stand transfer;toilet transfer   Sit-Stand Transfer   Sit-Stand [place] (Transfers) contact guard   Assistive Device (Sit-Stand Transfers) walker, front-wheeled   Toilet Transfer   Type (Toilet Transfer) stand-sit;sit-stand   [place] Level (Toilet Transfer) contact guard;verbal cues   Assistive Device (Toilet Transfer) grab bars/safety frame;walker, front-wheeled   Activities of Daily Living   BADL Assessment bathing;toileting;lower body dressing   Bathing Assessment   [place] Level (Bathing) contact guard assist   Assistive Devices (Bathing) grab bar, tub/shower   Comment (Bathing) Pt performed tub transfer with CGA and use of grab bar   Lower Body Dressing Assessment   [place] Level (Lower Body Dressing) minimum assist (75% patient effort)   Position (Lower Body Dressing) edge of bed sitting   Toileting   [place] Level (Toileting) supervision   Assistive Devices (Toileting) grab bar, toilet   Comment (Toileting) CGA for toilet transfer   Clinical Impression   Criteria for Skilled Therapeutic Interventions Met (OT) yes;meets criteria;skilled treatment is necessary   OT Diagnosis Impaired independence with I/ADLs   OT Problem List-Impairments impacting ADL activity tolerance impaired;range of motion  (ROM);pain;strength;post-surgical precautions   Assessment of Occupational Performance 1-3 Performance Deficits   Identified Performance Deficits toileting, bathing, dressing   Planned Therapy Interventions (OT) ADL retraining;bed mobility training;transfer training   Clinical Decision Making Complexity (OT) low complexity   Therapy Frequency (OT) 1x eval and treat   Anticipated Equipment Needs Upon Discharge (OT)   (leg )   Risk & Benefits of therapy have been explained evaluation/treatment results reviewed;care plan/treatment goals reviewed;risks/benefits reviewed;current/potential barriers reviewed;participants voiced agreement with care plan;participants included;patient;spouse/significant other   OT Discharge Planning    OT Rationale for DC Rec Pt mobilizing with overall SBA, may require min A with bed mobility at home due to higher height bed. Recommend CGA for tub transfer at home, assist with I/ADLs as needed from spouse   OT Brief overview of current status  CGA bed mobility, SBA transfers and ambulation, CGA tub transfer   Total Evaluation Time (Minutes)   Total Evaluation Time (Minutes) 8

## 2021-07-14 NOTE — CONSULTS
"Care Management / Social Work IP consult placed by Cory Villalobos PA-C for \"discharge planning.\"  CM team unable to see pt prior to discharge, pt's discharge was planned and executed by other care providers.    Per CM chart review, documentation lists safe discharge plan by the following providers:  MARILUZ: \"Anticipated discharge date 7/14/2021 . Discharge to discharge destination: Home with No Skilled Service.\"   PT \"Ind with all mobiltiy and self-cares at home. Spouse Crow can assist as needed. has walker, cane, elevated toilet seat, sock aid at home. \" and \"Pt anticipates safe d/c to home with spouse assist and use of FWW for home ambulation.P    CM-RN will do a full chart review consult as appropriate to complete consult.     Care Management Initial Consult    General Information  Assessment completed with: VM-chart review,    Type of CM/SW Visit: Chart Assessment    Primary Care Provider verified and updated as needed: Yes ([facility]  Carmencita Lindsay [phone])   Readmission within the last 30 days:   no      Advance Care Planning:     HCD on file in Epic dated 09/10/2013    Communication Assessment  Patient's communication style: spoken language (English or Bilingual)    Hearing Difficulty or Deaf: no   Wear Glasses or Blind: yes    Cognitive  Cognitive/Neuro/Behavioral: WDL                      Living Environment:   People in home: spouse   Crow [phone]   Current living Arrangements: house      Able to return to prior arrangements: yes       Family/Social Support:  Care provided by: self  Provides care for: no one  Marital Status:     Crow       Description of Support System: Supportive, Involved    Support Assessment: Adequate family and caregiver support    Current Resources:   Patient receiving home care services: No   Community Resources: None  Equipment currently used at home: none (pt has toilet safety frame, reacher, sock aid)  Supplies currently used at home: " None    Employment/Financial:  Employment Status: retired (2009)     Employment/ Comments: railroad   Financial Concerns: No concerns identified    Finance Comments: active MEDICARE/RR MEDICARE and BCBS[place] insurance     Lifestyle & Psychosocial Needs:  This is an outpatient assessment that was not done as inpatient   Social Determinants of Health     Tobacco Use: Medium Risk     Smoking Tobacco Use: Former Smoker     Smokeless Tobacco Use: Never Used   Alcohol Use:      Frequency of Alcohol Consumption:      Average Number of Drinks:      Frequency of Binge Drinking:    Financial Resource Strain:      Difficulty of Paying Living Expenses:    Food Insecurity:      Worried About Running Out of Food in the Last Year:      Ran Out of Food in the Last Year:    Transportation Needs:      Lack of Transportation (Medical):      Lack of Transportation (Non-Medical):    Physical Activity:      Days of Exercise per Week:      Minutes of Exercise per Session:    Stress:      Feeling of Stress :    Social Connections:      Frequency of Communication with Friends and Family:      Frequency of Social Gatherings with Friends and Family:      Attends Orthodox Services:      Active Member of Clubs or Organizations:      Attends Club or Organization Meetings:      Marital Status:    Intimate Partner Violence:      Fear of Current or Ex-Partner:      Emotionally Abused:      Physically Abused:      Sexually Abused:    Depression: Not at risk     PHQ-2 Score: 0   Housing Stability:      Unable to Pay for Housing in the Last Year:      Number of Places Lived in the Last Year:      Unstable Housing in the Last Year:      Functional Status:  Prior to admission patient needed assistance: none     Mental Health Status:  Mental Health Status: No Current Concerns       Chemical Dependency Status:  Chemical Dependency Status: No Current Concerns       Values/Beliefs:  Spiritual, Cultural Beliefs, Orthodox Practices,  Values that affect care: no         Additional Information:  As above    Luz Mraia Montilla RN, BSN, PHN  [facility]  Inpatient Care Management - FLOAT  Mobile: [phone] 07/14/21 until 4pm  (after today's date, please call the patient's unit)

## 2021-07-14 NOTE — PROGRESS NOTES
[facility]    Medicine Progress Note - Hospitalist Service       Date of Admission:  7/13/2021    Assessment & Plan         Kylee Cavazos is a 71 year old female admitted on 7/13/2021.     Past medical history significant for OA, Osteoporosis, HLP, GERD, Obesity, Ulcerative proctitis, History of CVA who underwent an elective left ADOLPH.       OA with degenerative changes of the left hip s/p left ADOLPH  Acute blood loss anemia  POD# 1. Patient with previous right ADOLPH and bilateral TKA.    - Orthopedic Service is managing.               --Defer analgesic management, DVT prophylaxis, PT/OT.                 --Defer resumption of ASA to Ortho.    - follow hemoglobin, consider transfusion for hemoglobin < 7 g/dl  - Encourage , activity, incentive spirometer use     Osteoporosis  - resume Alendronate     HLP  - resume PTA rosuvastatin 5 mg/d    GERD  - PTA omeprazole 20 mg/d     Platelet Defect  - home medication list includes an antiplatelet medication    Obesity with associated LENNIE  Body mass index is 37.04 kg/m .  Increase in all-cause morbidity and mortality.   - patient would benefit from even modest weight loss  - Follow up with PCP regarding ongoing management.         Ulcerative proctitis  Patient had her enema last night and is not due again until Saturday.    - Resume PTA Mesalamine 16887 mg/d.  - Hold every 5th day mesalamine enema and resume at discharge.       History of CVA (2009)  - Defer resumption of  mg/d to Ortho.       Diet: Advance Diet as Tolerated: Regular Diet Adult  Discharge Instruction - Regular Diet Adult     DVT Prophylaxis: Defer to primary service   [place] Catheter: Not present  Code Status: Full Code        Diet: Advance Diet as Tolerated: Regular Diet Adult  Discharge Instruction - Regular Diet Adult    DVT Prophylaxis: Defer to primary service  Bur[place]aga Catheter: Not present  Central Lines: None  Code Status: Full Code      Disposition Plan   Expected  "discharge: today recommended to prior living arrangement once PT session complete.     The patient's care was discussed with the Bedside Nurse and Patient.    Iman Gottlieb MD  Hospitalist Service  [facility]  Securely message with the Vocera Web Console (learn more here)  Text page via [employer] Paging/Directory      Risk Factors Present on Admission                  ______________________________________________________________________    Interval History    \"I have pain, but the pain is different.\"  Patient says she had pain, especially with therapy session, improved with pain medication.  She is anxious for discharge.  She has no new respiratory or GI complaints.    Data reviewed today: I reviewed all medications, new labs and imaging results over the last 24 hours. I personally reviewed the XR pelvis and hip image(s) showing Left total hip arthroplasty without apparent complication. .    Physical Exam   Vital Signs: Temp: 97.8  F (36.6  C) Temp src: Oral BP: 124/64 Pulse: 64   Resp: 16 SpO2: 98 % O2 Device: None (Room air) Oxygen Delivery: 2 LPM  Weight: 209 lbs 1.6 oz  Constitutional: Awake, alert, cooperative, no apparent distress  Respiratory: Clear to auscultation bilaterally, no crackles or wheezing  Cardiovascular: Regular rate and rhythm, normal S1 and S2, and no murmur noted  GI: Normal bowel sounds, soft, non-distended, non-tender  Skin/Integumen: No rash on exposed skin.  Cannot examine incision as she is wearing trousers  Other: Mood is very pleasant      Data   Recent Labs   Lab 07/14/21  0701   HGB 10.6*      POTASSIUM 4.0   CHLORIDE 108   CO2 25   BUN 15   CR 0.87   ANIONGAP 5   VINICIO 8.6   *     Recent Results (from the past 24 hour(s))   XR Pelvis w Hip Port Left 1 View    Narrative    XR PELVIS AND HIP PORTABLE LEFT 1 VIEW 7/13/2021 10:44 AM     HISTORY: s/p left ADOLPH      Impression    IMPRESSION: Left total hip arthroplasty without apparent " complication.  Right total hip arthroplasty is also noted.    MARCO DAWKINS MD         SYSTEM ID:  [id number]     Medications     lactated ringers 100 mL/hr at 07/13/21 1502       acetaminophen  975 mg Oral Q8H     aspirin  325 mg Oral Daily     celecoxib  100 mg Oral BID     mesalamine  3,600 mg Oral Daily with breakfast     polyethylene glycol  17 g Oral Daily     senna-docusate  1 tablet Oral BID     sodium chloride (PF)  3 mL Intracatheter Q8H

## 2021-07-14 NOTE — PLAN OF CARE
Physical Therapy Discharge Summary    Reason for therapy discharge:    Goals met adequately for discharge home with spouse assist for stairs (only goal not met was SBA for stairs which pt performed with Ginny of spouse)    Progress towards therapy goal(s). See goals on Care Plan in [facility] electronic health record for goal details.  Goals met adequately for discharge    Therapy recommendation(s):    Continue home exercise program.  ADOLPH exercises 3x/day, ambulation program with walker

## 2021-07-14 NOTE — PLAN OF CARE
Patient vital signs are at baseline: Yes  Patient able to ambulate as they were prior to admission or with assist devices provided by therapies during their stay:  Yes  Patient MUST void prior to discharge:  Yes  Patient able to tolerate oral intake:  Yes  Pain has adequate pain control using Oral analgesics:  Yes    A/Ox4. VSS on RA. Up 1-A to BR w/ walker/GB. L hip dressing CDI. CMS intact. Pain is managed by PRN Oxycodone. Voiding well per BR. AVS instruction given and signed. Teach back done. All belongings checked and sent.

## 2021-07-26 ENCOUNTER — TRANSFERRED RECORDS (OUTPATIENT)
Dept: FAMILY MEDICINE | Facility: CLINIC | Age: 71
End: 2021-07-26

## 2021-07-26 DIAGNOSIS — M85.80 OSTEOPENIA, UNSPECIFIED LOCATION: Primary | ICD-10-CM

## 2021-07-26 RX ORDER — ALENDRONATE SODIUM 70 MG/1
70 TABLET ORAL
Qty: 12 TABLET | Refills: 3 | Status: SHIPPED | OUTPATIENT
Start: 2021-07-26 | End: 2022-06-24

## 2021-07-30 DIAGNOSIS — E78.5 HYPERLIPIDEMIA WITH TARGET LDL LESS THAN 100: Primary | ICD-10-CM

## 2021-07-30 RX ORDER — ROSUVASTATIN CALCIUM 5 MG/1
5 TABLET, COATED ORAL EVERY EVENING
Qty: 90 TABLET | Refills: 3 | Status: SHIPPED | OUTPATIENT
Start: 2021-07-30 | End: 2021-11-30

## 2021-08-02 ENCOUNTER — TRANSFERRED RECORDS (OUTPATIENT)
Dept: FAMILY MEDICINE | Facility: CLINIC | Age: 71
End: 2021-08-02

## 2021-08-23 ENCOUNTER — TRANSFERRED RECORDS (OUTPATIENT)
Dept: FAMILY MEDICINE | Facility: CLINIC | Age: 71
End: 2021-08-23

## 2021-08-30 ENCOUNTER — TRANSFERRED RECORDS (OUTPATIENT)
Dept: FAMILY MEDICINE | Facility: CLINIC | Age: 71
End: 2021-08-30

## 2021-10-28 DIAGNOSIS — K21.9 GASTROESOPHAGEAL REFLUX DISEASE, UNSPECIFIED WHETHER ESOPHAGITIS PRESENT: Primary | ICD-10-CM

## 2021-11-22 DIAGNOSIS — Z13.220 SCREENING FOR LIPOID DISORDERS: ICD-10-CM

## 2021-11-22 DIAGNOSIS — Z13.228 SCREENING FOR ENDOCRINE, METABOLIC AND IMMUNITY DISORDER: ICD-10-CM

## 2021-11-22 DIAGNOSIS — Z13.29 SCREENING FOR ENDOCRINE, METABOLIC AND IMMUNITY DISORDER: ICD-10-CM

## 2021-11-22 DIAGNOSIS — Z13.0 SCREENING FOR ENDOCRINE, METABOLIC AND IMMUNITY DISORDER: ICD-10-CM

## 2021-11-22 DIAGNOSIS — E78.5 HYPERLIPIDEMIA WITH TARGET LDL LESS THAN 100: Primary | ICD-10-CM

## 2021-11-22 DIAGNOSIS — D64.9 ANEMIA, UNSPECIFIED TYPE: ICD-10-CM

## 2021-11-24 DIAGNOSIS — Z13.0 SCREENING FOR ENDOCRINE, METABOLIC AND IMMUNITY DISORDER: ICD-10-CM

## 2021-11-24 DIAGNOSIS — Z13.29 SCREENING FOR ENDOCRINE, METABOLIC AND IMMUNITY DISORDER: ICD-10-CM

## 2021-11-24 DIAGNOSIS — D64.9 ANEMIA, UNSPECIFIED TYPE: ICD-10-CM

## 2021-11-24 DIAGNOSIS — E78.5 HYPERLIPIDEMIA WITH TARGET LDL LESS THAN 100: ICD-10-CM

## 2021-11-24 DIAGNOSIS — Z13.228 SCREENING FOR ENDOCRINE, METABOLIC AND IMMUNITY DISORDER: ICD-10-CM

## 2021-11-24 LAB
% GRANULOCYTES: 50.7 % (ref 42.2–75.2)
HCT VFR BLD AUTO: 40.8 % (ref 35–46)
HEMOGLOBIN: 13 G/DL (ref 11.8–15.5)
LYMPHOCYTES NFR BLD AUTO: 39.1 % (ref 20.5–51.1)
MCH RBC QN AUTO: 30.2 PG (ref 27–31)
MCHC RBC AUTO-ENTMCNC: 31.9 G/DL (ref 33–37)
MCV RBC AUTO: 94.6 FL (ref 80–100)
MONOCYTES NFR BLD AUTO: 10.2 % (ref 1.7–9.3)
PLATELET # BLD AUTO: 280 K/UL (ref 140–450)
RBC # BLD AUTO: 4.32 X10/CMM (ref 3.7–5.2)
WBC # BLD AUTO: 3.6 X10/CMM (ref 3.8–11)

## 2021-11-24 PROCEDURE — 36415 COLL VENOUS BLD VENIPUNCTURE: CPT | Performed by: NURSE PRACTITIONER

## 2021-11-24 PROCEDURE — 85025 COMPLETE CBC W/AUTO DIFF WBC: CPT | Performed by: NURSE PRACTITIONER

## 2021-11-27 LAB
ALBUMIN SERPL-MCNC: 3.9 G/DL (ref 3.7–4.7)
ALBUMIN/GLOB SERPL: 1.2 {RATIO} (ref 1.2–2.2)
ALP SERPL-CCNC: 71 IU/L (ref 44–121)
ALT SERPL-CCNC: 5 IU/L (ref 0–32)
AST SERPL-CCNC: 19 IU/L (ref 0–40)
BILIRUB SERPL-MCNC: 0.7 MG/DL (ref 0–1.2)
BUN SERPL-MCNC: 12 MG/DL (ref 8–27)
BUN/CREATININE RATIO: 15 (ref 12–28)
CALCIUM SERPL-MCNC: 9.3 MG/DL (ref 8.7–10.3)
CHLORIDE SERPLBLD-SCNC: 105 MMOL/L (ref 96–106)
CHOLEST SERPL-MCNC: 148 MG/DL (ref 100–199)
CREAT SERPL-MCNC: 0.82 MG/DL (ref 0.57–1)
EGFR IF AFRICN AM: 82 ML/MIN/1.73
EGFR IF NONAFRICN AM: 71 ML/MIN/1.73
GLOBULIN, TOTAL: 3.3 G/DL (ref 1.5–4.5)
GLUCOSE SERPL-MCNC: 93 MG/DL (ref 65–99)
HDLC SERPL-MCNC: 48 MG/DL
LDL/HDL RATIO: 1.7 RATIO (ref 0–3.2)
LDLC SERPL CALC-MCNC: 83 MG/DL (ref 0–99)
POTASSIUM SERPL-SCNC: 4.4 MMOL/L (ref 3.5–5.2)
PROT SERPL-MCNC: 7.2 G/DL (ref 6–8.5)
SODIUM SERPL-SCNC: 140 MMOL/L (ref 134–144)
TOTAL CO2: 21 MMOL/L (ref 20–29)
TRIGL SERPL-MCNC: 91 MG/DL (ref 0–149)
VLDLC SERPL CALC-MCNC: 17 MG/DL (ref 5–40)

## 2021-11-30 ENCOUNTER — OFFICE VISIT (OUTPATIENT)
Dept: FAMILY MEDICINE | Facility: CLINIC | Age: 71
End: 2021-11-30

## 2021-11-30 VITALS
HEIGHT: 63 IN | BODY MASS INDEX: 36.21 KG/M2 | SYSTOLIC BLOOD PRESSURE: 124 MMHG | RESPIRATION RATE: 16 BRPM | OXYGEN SATURATION: 99 % | HEART RATE: 52 BPM | WEIGHT: 204.38 LBS | DIASTOLIC BLOOD PRESSURE: 68 MMHG

## 2021-11-30 DIAGNOSIS — M85.89 OSTEOPENIA OF MULTIPLE SITES: ICD-10-CM

## 2021-11-30 DIAGNOSIS — Z00.00 ENCOUNTER FOR MEDICARE ANNUAL WELLNESS EXAM: Primary | ICD-10-CM

## 2021-11-30 DIAGNOSIS — E78.5 HYPERLIPIDEMIA WITH TARGET LDL LESS THAN 100: ICD-10-CM

## 2021-11-30 DIAGNOSIS — E66.01 MORBID OBESITY (H): ICD-10-CM

## 2021-11-30 DIAGNOSIS — K51.218 ULCERATIVE PROCTITIS WITH OTHER COMPLICATION (H): ICD-10-CM

## 2021-11-30 DIAGNOSIS — K21.9 GASTROESOPHAGEAL REFLUX DISEASE WITHOUT ESOPHAGITIS: ICD-10-CM

## 2021-11-30 DIAGNOSIS — Z86.73 HISTORY OF STROKE: ICD-10-CM

## 2021-11-30 PROBLEM — D50.0 IRON DEFICIENCY ANEMIA DUE TO CHRONIC BLOOD LOSS: Status: ACTIVE | Noted: 2021-11-30

## 2021-11-30 PROBLEM — K63.89 PROCTOSIGMOIDITIS: Status: ACTIVE | Noted: 2017-05-03

## 2021-11-30 PROCEDURE — 99214 OFFICE O/P EST MOD 30 MIN: CPT | Mod: 25 | Performed by: NURSE PRACTITIONER

## 2021-11-30 PROCEDURE — G0439 PPPS, SUBSEQ VISIT: HCPCS | Performed by: NURSE PRACTITIONER

## 2021-11-30 RX ORDER — ROSUVASTATIN CALCIUM 20 MG/1
20 TABLET, COATED ORAL EVERY EVENING
Qty: 90 TABLET | Refills: 3 | Status: SHIPPED | OUTPATIENT
Start: 2021-11-30 | End: 2022-10-24

## 2021-11-30 ASSESSMENT — MIFFLIN-ST. JEOR: SCORE: 1401.17

## 2021-11-30 NOTE — PATIENT INSTRUCTIONS
Patient Education   Personalized Prevention Plan  You are due for the preventive services outlined below.  Your care team is available to assist you in scheduling these services.  If you have already completed any of these items, please share that information with your care team to update in your medical record.  Health Maintenance Due   Topic Date Due     LUNG CANCER SCREENING  Never done     FALL RISK ASSESSMENT  11/25/2021           Increase the rosuvastatin dose to 20mg daily- take four tablets at night of what you have, then the new bottle will be just one tablet at night    DEXA ordered    Work on getting the heart rate up, watching the healthy diet, gradually getting back to more activity

## 2021-11-30 NOTE — PROGRESS NOTES
"  SUBJECTIVE:   Kylee Cavazos is a 71 year old female who presents for Preventive Visit.    Recovering after hip surgery, feels it's taking awhile to get her stamina back but slowly getting better.  UC: Due for colonoscopy this spring, follows with GI. On both oral mesalamine and mesalamine enemas.  Morbid obesity: Working on increasing the the exercise after her hip surgery, eats small meals but does snack frequently throughout the day.  HLD: On rosuvastatin 5mg daily, tolerating without incident. Has a remote hx of CVA. Denies exertional symptoms or neurologic symptoms.  GERD: Continues omeprazole 20mg daily. No dysphagia or alarm symptoms.    Patient has been advised of split billing requirements and indicates understanding:   Are you in the first 12 months of your Medicare Part B coverage?  No    Physical Health:    In general, how would you rate your overall physical health? good    Outside of work, how many days during the week do you exercise? 2-3 days/week got a stationary bike, working on steps after hip surgery    Outside of work, approximately how many minutes a day do you exercise?30-45 minutes    If you drink alcohol do you typically have >3 drinks per day or >7 drinks per week? No    Do you usually eat at least 4 servings of fruit and vegetables a day, include whole grains & fiber and avoid regularly eating high fat or \"junk\" foods? Yes- eats breakfast and an early dinner    Do you have any problems taking medications regularly?  No    Do you have any side effects from medications? not applicable    Needs assistance for the following daily activities: no assistance needed    Which of the following safety concerns are present in your home?  none identified     Hearing impairment: No    HEARING FREQUENCY    Right Ear:   500-PASS  1000-PASS  2000-PASS  4000- PASS     Left Ear:     500-PASS  1000-PASS  2000-PASS  4000- FAIL     Hearing Acuity: Pass      Hearing Assessment: normal    In the past " 6 months, have you been bothered by leaking of urine? Yes- not all the time, just with urgency at times. Denies need for intervention.    Mental Health:    In general, how would you rate your overall mental or emotional health? good  PHQ-2 Score:      Do you feel safe in your environment? Yes    Have you ever done Advance Care Planning? (For example, a Health Directive, POLST, or a discussion with a medical provider or your loved ones about your wishes): Yes, advance care planning is on file.    Additional concerns to address?  No    Fall risk:  Fallen 2 or more times in the past year?: No  Any fall with injury in the past year?: No    Cognitive Screenin) Repeat 3 items (Leader, Season, Table)    2) Clock draw: NORMAL  3) 3 item recall: Recalls 3 objects  Results: 3 items recalled: COGNITIVE IMPAIRMENT LESS LIKELY    Mini-CogTM Copyright S Mekhi. Licensed by the author for use in [facility]; reprinted with permission (jonah@[facility]). All rights reserved.      Do you have sleep apnea, excessive snoring or daytime drowsiness?: no            Reviewed and updated as needed this visit by clinical staff  Tobacco   Meds   Med Hx  Surg Hx  Fam Hx  Soc Hx       Reviewed and updated as needed this visit by Provider  Tobacco     Med Hx  Surg Hx  Fam Hx  Soc Hx      Social History     Tobacco Use     Smoking status: Former Smoker     Packs/day: 0.50     Years: 35.00     Pack years: 17.50     Types: Cigarettes     Quit date: 2009     Years since quittin.6     Smokeless tobacco: Never Used   Substance Use Topics     Alcohol use: Not Currently     Alcohol/week: 0.0 - 1.0 standard drinks                           Current providers sharing in care for this patient include:   Patient Care Team:  Carmencita Lindsay APRN CNP as PCP - General (Nurse Practitioner)  Carmencita Lindsay APRN CNP as Assigned PCP    The following health maintenance items are reviewed in Epic and correct as of  today:  Health Maintenance   Topic Date Due     COVID-19 Vaccine (4 - Booster for Pfizer series) 04/04/2022     DTAP/TDAP/TD IMMUNIZATION (3 - Td or Tdap) 05/17/2022     DEXA  07/27/2022     MEDICARE ANNUAL WELLNESS VISIT  11/30/2022     FALL RISK ASSESSMENT  11/30/2022     MAMMO SCREENING  08/23/2023     LIPID  11/24/2026     ADVANCE CARE PLANNING  12/01/2026     COLORECTAL CANCER SCREENING  04/28/2027     HEPATITIS C SCREENING  Completed     PHQ-2  Completed     INFLUENZA VACCINE  Completed     Pneumococcal Vaccine: 65+ Years  Completed     ZOSTER IMMUNIZATION  Completed     IPV IMMUNIZATION  Aged Out     MENINGITIS IMMUNIZATION  Aged Out     HEPATITIS B IMMUNIZATION  Aged Out     LUNG CANCER SCREENING  Discontinued     Lab work is in process  Labs reviewed in EPIC  BP Readings from Last 3 Encounters:   11/30/21 124/68   07/14/21 124/64   07/06/21 118/68    Wt Readings from Last 3 Encounters:   11/30/21 92.7 kg (204 lb 6 oz)   07/13/21 94.8 kg (209 lb 1.6 oz)   07/06/21 94.1 kg (207 lb 6 oz)                  Patient Active Problem List   Diagnosis     GERD (gastroesophageal reflux disease)     Hyperlipidemia with target LDL less than 100     History of stroke     Health Care Home     Hx of total knee arthroplasty     Low back pain associated with a spinal disorder other than radiculopathy or spinal stenosis     Ulcerative proctitis with other complication (H)     Edema of both legs     Obesity (BMI 35.0-39.9) with comorbidity (H)     S/P total hip arthroplasty     Diverticular disease of colon     Iron deficiency anemia due to chronic blood loss     Proctosigmoiditis     Osteopenia of multiple sites     Past Surgical History:   Procedure Laterality Date     ARTHROPLASTY HIP Left 07/13/2021    Procedure: LEFT TOTAL HIP ARTHROPLASTY AND OPEN RIGHT HIP ABDUCTOR TENDON REPAIR;  Surgeon: Vidal León MD;  Location: [facility] OR     ARTHROPLASTY KNEE  07/08/2014    Procedure: ARTHROPLASTY KNEE;  Surgeon: Brian Tobin  MD Joseph;  Location: [facility] OR     ARTHROPLASTY KNEE Right 2014    Procedure: ARTHROPLASTY KNEE;  Surgeon: Brian Tobin MD;  Location: [facility] OR     CARPAL TUNNEL RELEASE RT/LT       DENTAL SURGERY Left 2017    left dental implant     JOINT REPLACEMENT, HIP RT/LT Right     Joint Replacement Hip RT/LT       Social History     Tobacco Use     Smoking status: Former Smoker     Packs/day: 0.50     Years: 35.00     Pack years: 17.50     Types: Cigarettes     Quit date: 2009     Years since quittin.6     Smokeless tobacco: Never Used   Substance Use Topics     Alcohol use: Not Currently     Alcohol/week: 0.0 - 1.0 standard drinks     Family History   Problem Relation Age of Onset     Diabetes Mother      Hypertension Mother      C.A.D. Father      No Known Problems Sister      No Known Problems Sister      C.A.D. Brother         passed away early 50s         Current Outpatient Medications   Medication Sig Dispense Refill     acetaminophen (TYLENOL) 325 MG tablet Take 2 tablets (650 mg) by mouth every 4 hours as needed for other (mild pain) 100 tablet 0     alendronate (FOSAMAX) 70 MG tablet Take 1 tablet (70 mg) by mouth every 7 days  12 tablet 3     aspirin (ASA) 325 MG EC tablet Take 1 tablet (325 mg) by mouth daily 60 tablet 0     calcium gluconate 500 MG TABS tablet Take 1,000 mg by mouth 2 times daily        Cholecalciferol (VITAMIN D3) 50 MCG (2000 UT) CHEW Take 4,000 Units by mouth daily        mesalamine (LIALDA) 1.2 g EC tablet Take 3,600 mg by mouth daily (with breakfast) (3 X 1,200 mg)       Mesalamine 4 GM/60ML SF ENEM Place rectally See Admin Instructions Uses every 4th  nightly       omeprazole (PRILOSEC) 20 MG DR capsule TAKE ONE CAPSULE BY MOUTH DAILY 90 capsule 3     polyethylene glycol (MIRALAX) powder Take 1 capful by mouth as needed        rosuvastatin (CRESTOR) 20 MG tablet Take 1 tablet (20 mg) by mouth every evening 90 tablet 3     No Known Allergies  Recent Labs  "  Lab Test 11/24/21  1021 07/14/21  0701 06/11/21  0000 11/18/20  0945 11/11/19  1015 06/11/15  0953 07/08/14  0845   LDL 83  --   --  88 90   < >  --    HDL 48  --   --  50 56   < >  --    TRIG 91  --   --  114 77   < >  --    ALT 5  --   --  6 7   < >  --    CR 0.82 0.87   < > 0.72 1.02*   < > 0.97   GFRESTIMATED  --  66  --   --   --   --  57*   GFRESTBLACK  --   --   --   --   --   --  70   POTASSIUM 4.4 4.0   < > 4.4 4.4   < > 4.4    < > = values in this interval not displayed.        Last Pap/HPV: no longer indicated  Last mammogram: 8/2021  Last DEXA: 12/2019  Last colonoscopy: 2020, due for repeat- inadequate prep      ROS:  Gen, HEENT, breast, CV, resp, GI, , MSK, heme/lymph, endo, skin, neuro, psych negative except as listed per HPI      OBJECTIVE:   Physical Exam:    /68   Pulse 52   Resp 16   Ht 1.6 m (5' 3\")   Wt 92.7 kg (204 lb 6 oz)   SpO2 99%   BMI 36.20 kg/m      CONSTITUTIONAL: Alert non-toxic appearing female in no acute distress  HEAD: Normocephalic, atraumatic  EYES: PERRLA; no scleral icterus; sclera without injection  ENT: EACs clear bilaterally, TMs pearly gray and intact with good visualization of bony landmarks and cone of light, no bulging or erythema; nares patent without ulceration or edema; oropharynx pink without lesions; posterior pharynx without exudates  NECK: Neck supple without lymphadenopathy, thyroid without enlargement or nodularity  BREAST: Not performed after shared clinical decision making and discussion of USPSTF recommendations  RESPIRATORY: Lungs clear to auscultation, respirations unlabored  CV: Regular rate and rhythm, S1S2, no clicks, murmurs, rubs, or gallops; bilateral lower extremities without edema, posterior tibialis pulses 2+ bilaterally  GASTROINTESTINAL: Normoactive bowel sounds x4 quadrants, abdomen soft, non-distended, and non-tender to palpation without masses or organomegaly  : Not performed  MUSCULOSKELETAL: Moves all extremities, no " obvious muscle wasting  NEUROLOGIC: No gross deficits, normal gait  SKIN: Appropriate color for race, warm and dry, no suspicious lesions or rashes  PSYCHIATRIC: Pleasant and interactive, affect euthymic, makes appropriate eye contact, thought process logical      Diagnostic Test Results:  Labs reviewed in Epic    ASSESSMENT / PLAN:   Kylee was seen today for physical.    Diagnoses and all orders for this visit:    Encounter for Medicare annual wellness exam    Well appearing 71 year old female. See below for counseling.    Osteopenia of multiple sites  -     DEXA - Hip/Pelvis/Spine (FUTURE/[facility]); Future    Continue alendronate, calcium, vit D, weight bearing exercise.    Hyperlipidemia with target LDL less than 100  -     rosuvastatin (CRESTOR) 20 MG tablet; Take 1 tablet (20 mg) by mouth every evening    Hx of CVA- increase to high intensity statin with rosuva 20    History of stroke    See above. No smoking, continues statin, good control of blood pressure. Recommend healthy diet and exercise.    Obesity (BMI 35.0-39.9) with comorbidity (H)    Likely impacting HLD, work on weight loss as this may help with HLD.    Gastroesophageal reflux disease without esophagitis    Continues omeprazole, hopefully will improve with control of morbid obesity. Long term use of omeprazole not ideal, will continue to address.    Ulcerative proctitis with other complication (H)    Follows with GI      Patient has been advised of split billing requirements and indicates understanding: Yes    COUNSELING:  Reviewed preventive health counseling, as reflected in patient instructions  Special attention given to:       Regular exercise       Healthy diet/nutrition       Bladder control       Immunizations    Up to date           Aspirin prophylaxis        Osteoporosis prevention/bone health       Consider lung cancer screening for ages 55-80 years and 30 pack-year smoking history- not indicated       Colon cancer  "screening    Estimated body mass index is 36.2 kg/m  as calculated from the following:    Height as of this encounter: 1.6 m (5' 3\").    Weight as of this encounter: 92.7 kg (204 lb 6 oz).    Weight management plan: Discussed healthy diet and exercise guidelines    She reports that she quit smoking about 12 years ago. Her smoking use included cigarettes. She has a 17.50 pack-year smoking history. She has never used smokeless tobacco.    Appropriate preventive services were discussed with this patient, including applicable screening as appropriate for cardiovascular disease, diabetes, osteopenia/osteoporosis, and glaucoma.  As appropriate for age/gender, discussed screening for colorectal cancer, prostate cancer, breast cancer, and cervical cancer. Checklist reviewing preventive services available has been given to the patient.    Reviewed patients plan of care and provided an AVS. The Basic Care Plan (routine screening as documented in Health Maintenance) for Kylee meets the Care Plan requirement. This Care Plan has been established and reviewed with the Patient.    Counseling Resources:  ATP IV Guidelines  Pooled Cohorts Equation Calculator  Breast Cancer Risk Calculator  BRCA-Related Cancer Risk Assessment: FHS-7 Tool  FRAX Risk Assessment  ICSI Preventive Guidelines  Dietary Guidelines for Americans, 2010  USDA's MyPlate  ASA Prophylaxis  Lung CA Screening    RANJAN Khalil [facility]  "

## 2021-12-01 PROBLEM — M85.89 OSTEOPENIA OF MULTIPLE SITES: Status: ACTIVE | Noted: 2021-12-01

## 2021-12-01 PROBLEM — M85.80 OSTEOPENIA, UNSPECIFIED LOCATION: Status: RESOLVED | Noted: 2017-11-14 | Resolved: 2021-12-01

## 2021-12-16 ENCOUNTER — HOSPITAL ENCOUNTER (OUTPATIENT)
Dept: BONE DENSITY | Facility: CLINIC | Age: 71
Discharge: HOME | End: 2021-12-16
Attending: NURSE PRACTITIONER | Admitting: NURSE PRACTITIONER
Payer: MEDICARE

## 2021-12-16 DIAGNOSIS — M85.89 OSTEOPENIA OF MULTIPLE SITES: ICD-10-CM

## 2021-12-16 PROCEDURE — 77080 DXA BONE DENSITY AXIAL: CPT

## 2021-12-21 ENCOUNTER — TELEPHONE (OUTPATIENT)
Dept: FAMILY MEDICINE | Facility: CLINIC | Age: 71
End: 2021-12-21

## 2021-12-21 NOTE — TELEPHONE ENCOUNTER
Called patient with dexa result. Patient is now taking Alendronate and will continue for 5 years.

## 2021-12-21 NOTE — TELEPHONE ENCOUNTER
----- Message from RANJAN Khalil CNP sent at 12/21/2021  8:13 AM CST -----  There has been some worsening of the bone density, continue alendronate until it's been five years.  Thanks,  BRENT

## 2022-06-24 DIAGNOSIS — M85.80 OSTEOPENIA, UNSPECIFIED LOCATION: ICD-10-CM

## 2022-06-24 RX ORDER — ALENDRONATE SODIUM 70 MG/1
TABLET ORAL
Qty: 12 TABLET | Refills: 3 | Status: SHIPPED | OUTPATIENT
Start: 2022-06-24 | End: 2023-05-25

## 2022-06-27 ENCOUNTER — OFFICE VISIT (OUTPATIENT)
Dept: FAMILY MEDICINE | Facility: CLINIC | Age: 72
End: 2022-06-27

## 2022-06-27 VITALS
WEIGHT: 209.2 LBS | BODY MASS INDEX: 37.07 KG/M2 | TEMPERATURE: 97.8 F | DIASTOLIC BLOOD PRESSURE: 74 MMHG | RESPIRATION RATE: 16 BRPM | OXYGEN SATURATION: 97 % | SYSTOLIC BLOOD PRESSURE: 118 MMHG | HEART RATE: 66 BPM | HEIGHT: 63 IN
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
## 2022-06-27 DIAGNOSIS — E66.01 MORBID OBESITY (H): ICD-10-CM

## 2022-06-27 DIAGNOSIS — R07.81 RIB PAIN ON RIGHT SIDE: Primary | ICD-10-CM

## 2022-06-27 DIAGNOSIS — K51.218 ULCERATIVE PROCTITIS WITH OTHER COMPLICATION (H): ICD-10-CM

## 2022-06-27 PROCEDURE — 99213 OFFICE O/P EST LOW 20 MIN: CPT | Performed by: NURSE PRACTITIONER

## 2022-06-27 PROCEDURE — 71111 X-RAY EXAM RIBS/CHEST4/> VWS: CPT | Performed by: NURSE PRACTITIONER

## 2022-06-27 NOTE — PATIENT INSTRUCTIONS
Dear Kylee, it looks like you broke a rib.   Work on splinting with coughing/sneezing.  Continue Tylenol 1000mg 2-3 times per day. Let me know if you need anything else for pain control. You can try a lidocaine patch if you want, heat or cold if you want.  Goal is to control pain and continue taking deep breaths to minimize risk of pneumonia

## 2022-06-27 NOTE — PROGRESS NOTES
"Problem(s) Oriented visit        SUBJECTIVE:                                                    Kylee Cavazos is a 72 year old female who presents today for the followin days ago had a mechanical fall and hit the R rib. Foot got caught on the carpet, and the rest of her twisted. Hit right underneath the R breast on the underwire. Twisted leg a little but that doesn't bother her much anymore. Hurting with coughing, sneezing, laughing, moving. Has been getting better. Hx osteopenia, on alendronate. Tylenol has been somewhat helpful for pain control. Doesn't hurt with deep breathing.    Having a hard time losing weight, would like to lose 20lb. Restarting [employer] and would like to know if this is okay.        ROS:  Gen, CV, resp, MSK negative except as listed per HPI      OBJECTIVE:                                                    Physical Exam:    /74   Pulse 66   Temp 97.8  F (36.6  C) (Temporal)   Resp 16   Ht 1.6 m (5' 3\")   Wt 94.9 kg (209 lb 3.2 oz)   SpO2 97%   BMI 37.06 kg/m      CONSTITUTIONAL: Alert non-toxic appearing female in no acute distress  RESPIRATORY: Lungs clear to auscultation, respirations unlabored  CV: Regular rate and rhythm, S1S2, no clicks, murmurs, rubs, or gallops  MSK/skin: Ecchymosis along the dependent aspect of the R breast. No focal tenderness over the ribs, but points to the 7th/8th rib as where it hurts with sneezing.  NEUROLOGIC: No gross deficits  PSYCHIATRIC: Pleasant and interactive, affect euthymic, makes appropriate eye contact, thought process logical       ASSESSMENT/PLAN:                                                          Kylee was seen today for fall.    Diagnoses and all orders for this visit:    Rib pain on right side  Fall, initial encounter  -     X-ray rt Rib unilat 3 vws + PA chest    Rib pain after mechanical fall, improving over time and denies concerns with pain control or breathing. Appears to have a 7th or 8th rib " fracture on xray, formal radiology read is pending. Continue Tylenol 1g TID PRN (avoid NSAIDs with ulcerative proctitis). Can trial topical lidocaine, heat/ice if needed. Splint with activities, avoid heavy lifting right now. Expect full recovery in 4-6 weeks. Reviewed alarm s/sxs and when to seek further care. Continue alendronate.    Ulcerative proctitis with other complication (H)    Following with GI. No NSAIDs. Continues mesalamine, colonoscopy this year.    Morbid obesity (H)    BMI 37.06 with associated conditions of HLD. Treatment of morbid obesity would likely improve outcomes in associated conditions previously mentioned. Recommend monitoring diet, 150 minutes of cardiovascular exercise per week. Plans to restart [employer] which I am okay with- will be good for counting macros, portion control. Consider medical weight management if morbid obesity fails to improve with lifestyle changes.              Patient Instructions   Dear Kylee, it looks like you broke a rib.   Work on splinting with coughing/sneezing.  Continue Tylenol 1000mg 2-3 times per day. Let me know if you need anything else for pain control. You can try a lidocaine patch if you want, heat or cold if you want.  Goal is to control pain and continue taking deep breaths to minimize risk of pneumonia      RANJAN Khalil CNP  [facility]  Family Practice  [facility]  [phone]    For any issues my office # is [phone]

## 2022-08-23 ENCOUNTER — TRANSFERRED RECORDS (OUTPATIENT)
Dept: FAMILY MEDICINE | Facility: CLINIC | Age: 72
End: 2022-08-23

## 2022-08-26 ENCOUNTER — TRANSFERRED RECORDS (OUTPATIENT)
Dept: FAMILY MEDICINE | Facility: CLINIC | Age: 72
End: 2022-08-26

## 2022-08-30 ENCOUNTER — MEDICAL CORRESPONDENCE (OUTPATIENT)
Dept: FAMILY MEDICINE | Facility: CLINIC | Age: 72
End: 2022-08-30

## 2022-10-24 DIAGNOSIS — E78.5 HYPERLIPIDEMIA WITH TARGET LDL LESS THAN 100: ICD-10-CM

## 2022-10-24 DIAGNOSIS — K21.9 GASTROESOPHAGEAL REFLUX DISEASE, UNSPECIFIED WHETHER ESOPHAGITIS PRESENT: ICD-10-CM

## 2022-10-24 RX ORDER — ROSUVASTATIN CALCIUM 20 MG/1
TABLET, COATED ORAL
Qty: 90 TABLET | Refills: 3 | Status: SHIPPED | OUTPATIENT
Start: 2022-10-24 | End: 2023-10-17

## 2022-11-02 ENCOUNTER — TRANSFERRED RECORDS (OUTPATIENT)
Dept: FAMILY MEDICINE | Facility: CLINIC | Age: 72
End: 2022-11-02

## 2022-11-22 NOTE — PROGRESS NOTES
"SUBJECTIVE:   Kylee Cavazos is a 72 year old female who presents for Preventive Visit.    Patient has been advised of split billing requirements and indicates understanding: Yes  Are you in the first 12 months of your Medicare Part B coverage?  No    Physical Health:    In general, how would you rate your overall physical health? good    Outside of work, how many days during the week do you exercise? 2-3 days/week    Outside of work, approximately how many minutes a day do you exercise?15-30 minutes    If you drink alcohol do you typically have >3 drinks per day or >7 drinks per week? No    Do you usually eat at least 4 servings of fruit and vegetables a day, include whole grains & fiber and avoid regularly eating high fat or \"junk\" foods? Yes    Do you have any problems taking medications regularly?  No    Do you have any side effects from medications? not applicable    Needs assistance for the following daily activities: no assistance needed    Which of the following safety concerns are present in your home?  none identified     Hearing impairment: No    In the past 6 months, have you been bothered by leaking of urine? yes  Hearing Screening:  Right Ear  4000Hz: fail  2000Hz: pass  1000Hz: pass  500Hz: pass    Left Ear  4000Hz: fail  2000Hz: fail  1000Hz: pass    500Hz: fail    Mental Health:    In general, how would you rate your overall mental or emotional health? good  PHQ-2 Score:      Do you feel safe in your environment? Yes    Have you ever done Advance Care Planning? (For example, a Health Directive, POLST, or a discussion with a medical provider or your loved ones about your wishes): Yes, advance care planning is on file.    Fall risk:  Fallen 2 or more times in the past year?: Yes  Any fall with injury in the past year?: Yes    Cognitive Screenin) Repeat 3 items (Leader, Season, Table)    2) Clock draw: NORMAL  3) 3 item recall: Recalls 3 objects  Results: 3 items recalled: COGNITIVE " "IMPAIRMENT LESS LIKELY      OBJECTIVE:   /70   Pulse 77   Temp 98  F (36.7  C)   Resp 16   Ht 1.588 m (5' 2.5\")   Wt 92.9 kg (204 lb 12.8 oz)   SpO2 98%   BMI 36.86 kg/m   Estimated body mass index is 36.86 kg/m  as calculated from the following:    Height as of this encounter: 1.588 m (5' 2.5\").    Weight as of this encounter: 92.9 kg (204 lb 12.8 oz).      ASSESSMENT / PLAN:     Encounter for Medicare annual wellness exam  Screening for cardiovascular condition  Hyperlipidemia with target LDL less than 100  -     VENOUS COLLECTION  -     Comp. Metabolic Panel (14) ([facility])  -     Lipid Panel ([facility])    Failed hearing screening  -     Adult Audiology  Referral; Future    COUNSELING:  Reviewed preventive health counseling, as reflected in patient instructions       Regular exercise       Healthy diet/nutrition       Hearing screening    "

## 2022-11-22 NOTE — PATIENT INSTRUCTIONS
Patient Education   Personalized Prevention Plan  You are due for the preventive services outlined below.  Your care team is available to assist you in scheduling these services.  If you have already completed any of these items, please share that information with your care team to update in your medical record.  Health Maintenance Due   Topic Date Due     PHQ-2 (once per calendar year)  01/01/2022     Diptheria Tetanus Pertussis (DTAP/TDAP/TD) Vaccine (3 - Td or Tdap) 05/17/2022     COVID-19 Vaccine (5 - Booster for Pfizer series) 06/06/2022     Flu Vaccine (1) 09/01/2022     FALL RISK ASSESSMENT  11/30/2022

## 2022-12-01 ENCOUNTER — OFFICE VISIT (OUTPATIENT)
Dept: FAMILY MEDICINE | Facility: CLINIC | Age: 72
End: 2022-12-01

## 2022-12-01 VITALS
WEIGHT: 204.8 LBS | RESPIRATION RATE: 16 BRPM | HEART RATE: 77 BPM | SYSTOLIC BLOOD PRESSURE: 126 MMHG | BODY MASS INDEX: 36.29 KG/M2 | DIASTOLIC BLOOD PRESSURE: 70 MMHG | HEIGHT: 63 IN | TEMPERATURE: 98 F | OXYGEN SATURATION: 98 %

## 2022-12-01 DIAGNOSIS — Z13.6 SCREENING FOR CARDIOVASCULAR CONDITION: ICD-10-CM

## 2022-12-01 DIAGNOSIS — R94.120 FAILED HEARING SCREENING: ICD-10-CM

## 2022-12-01 DIAGNOSIS — Z00.00 ENCOUNTER FOR MEDICARE ANNUAL WELLNESS EXAM: Primary | ICD-10-CM

## 2022-12-01 DIAGNOSIS — E78.5 HYPERLIPIDEMIA WITH TARGET LDL LESS THAN 100: ICD-10-CM

## 2022-12-01 PROBLEM — R60.0 EDEMA OF BOTH LEGS: Status: RESOLVED | Noted: 2017-11-14 | Resolved: 2022-12-01

## 2022-12-01 PROBLEM — Z96.649 S/P TOTAL HIP ARTHROPLASTY: Status: RESOLVED | Noted: 2021-07-13 | Resolved: 2022-12-01

## 2022-12-01 PROCEDURE — G0439 PPPS, SUBSEQ VISIT: HCPCS | Performed by: FAMILY MEDICINE

## 2022-12-01 PROCEDURE — 36415 COLL VENOUS BLD VENIPUNCTURE: CPT | Performed by: FAMILY MEDICINE

## 2022-12-01 NOTE — LETTER
[facility] [address] [place]  [address]  Phone: [phone]    December 2, 2022      Kylee Glover [place] [address] [place] [address]            Dear Kylee,     It was jimmie to see you at your recent appointment.     Here are your lab results.     Your creatinine (a waste product which is gotten rid of by the kidneys into the urine) is elevated mildly. This typically signifies your kidneys are a little sluggish.     A patient is said to have chronic kidney disease (CKD) if they have abnormalities of kidney function for more than 3 months.     The most common causes of this are diabetes and high blood pressure. Both of these are hard on the kidneys over time.     The five stages of CKD refer to how well your kidneys are working.       In the early stages (Stages 1-3), your kidneys are still able to filter waste out of your blood.   In the later stages (Stages 4-5), your kidneys must work harder to filter your blood.     Your results are consistent with a diagnosis of chronic kidney disease (CKD) stage 3     Your cholesterol values look wonderful. Everything is within normal limits.     Please let us know if you have any questions.     Sincerely,     Dr. Sera Johnston         Results for orders placed or performed in visit on 12/01/22   Comp. Metabolic Panel (14) ([facility])     Status: Abnormal   Result Value Ref Range    Glucose 93 70 - 99 mg/dL    Urea Nitrogen 12 8 - 27 mg/dL    Creatinine 1.03 (H) 0.57 - 1.00 mg/dL    eGFR  57 (L) >59 mL/min/1.73    BUN/Creatinine Ratio 12 12 - 28    Sodium 143 134 - 144 mmol/L    Potassium 4.3 3.5 - 5.2 mmol/L    Chloride 105 96 - 106 mmol/L    Total CO2 25 20 - 29 mmol/L    Calcium 8.9 8.7 - 10.3 mg/dL    Protein Total 7.1 6.0 - 8.5 g/dL    Albumin 4.0 3.7 - 4.7 g/dL    Globulin, Total 3.1 1.5 - 4.5 g/dL    A/G Ratio 1.3 1.2 - 2.2    Bilirubin Total 0.6 0.0 - 1.2 mg/dL    Alkaline Phosphatase 67 44 - 121 IU/L    AST 19 0 - 40 IU/L     ALT 6 0 - 32 IU/L    Narrative    Performed at:  01 - [facility] [address], [place], [place]  [id number]  : Victor Manuel Ferris MD, Phone:  [phone]   Lipid Panel ([facility])     Status: None   Result Value Ref Range    Cholesterol 114 100 - 199 mg/dL    Triglycerides 73 0 - 149 mg/dL    HDL Cholesterol 42 >39 mg/dL    VLDL Cholesterol Vijay 15 5 - 40 mg/dL    LDL Cholesterol Calculated 57 0 - 99 mg/dL    LDL/HDL Ratio 1.4 0.0 - 3.2 ratio    Narrative    Performed at:  01 - [facility] [address], [place], [place]  [id number]  : Victor Manuel Ferris MD, Phone:  [phone]

## 2022-12-02 LAB
ALBUMIN SERPL-MCNC: 4 G/DL (ref 3.7–4.7)
ALBUMIN/GLOB SERPL: 1.3 {RATIO} (ref 1.2–2.2)
ALP SERPL-CCNC: 67 IU/L (ref 44–121)
ALT SERPL-CCNC: 6 IU/L (ref 0–32)
AST SERPL-CCNC: 19 IU/L (ref 0–40)
BILIRUB SERPL-MCNC: 0.6 MG/DL (ref 0–1.2)
BUN SERPL-MCNC: 12 MG/DL (ref 8–27)
BUN/CREATININE RATIO: 12 (ref 12–28)
CALCIUM SERPL-MCNC: 8.9 MG/DL (ref 8.7–10.3)
CHLORIDE SERPLBLD-SCNC: 105 MMOL/L (ref 96–106)
CHOLEST SERPL-MCNC: 114 MG/DL (ref 100–199)
CREAT SERPL-MCNC: 1.03 MG/DL (ref 0.57–1)
EGFR: 57 ML/MIN/1.73
GLOBULIN, TOTAL: 3.1 G/DL (ref 1.5–4.5)
GLUCOSE SERPL-MCNC: 93 MG/DL (ref 70–99)
HDLC SERPL-MCNC: 42 MG/DL
LDL/HDL RATIO: 1.4 RATIO (ref 0–3.2)
LDLC SERPL CALC-MCNC: 57 MG/DL (ref 0–99)
POTASSIUM SERPL-SCNC: 4.3 MMOL/L (ref 3.5–5.2)
PROT SERPL-MCNC: 7.1 G/DL (ref 6–8.5)
SODIUM SERPL-SCNC: 143 MMOL/L (ref 134–144)
TOTAL CO2: 25 MMOL/L (ref 20–29)
TRIGL SERPL-MCNC: 73 MG/DL (ref 0–149)
VLDLC SERPL CALC-MCNC: 15 MG/DL (ref 5–40)

## 2023-04-04 ENCOUNTER — TRANSFERRED RECORDS (OUTPATIENT)
Dept: FAMILY MEDICINE | Facility: CLINIC | Age: 73
End: 2023-04-04

## 2023-05-25 DIAGNOSIS — M85.80 OSTEOPENIA, UNSPECIFIED LOCATION: ICD-10-CM

## 2023-05-25 RX ORDER — ALENDRONATE SODIUM 70 MG/1
TABLET ORAL
Qty: 12 TABLET | Refills: 1 | Status: SHIPPED | OUTPATIENT
Start: 2023-05-25 | End: 2023-11-06

## 2023-05-26 ENCOUNTER — OFFICE VISIT (OUTPATIENT)
Dept: FAMILY MEDICINE | Facility: CLINIC | Age: 73
End: 2023-05-26

## 2023-05-26 VITALS
TEMPERATURE: 99.3 F | HEART RATE: 80 BPM | SYSTOLIC BLOOD PRESSURE: 140 MMHG | OXYGEN SATURATION: 99 % | DIASTOLIC BLOOD PRESSURE: 67 MMHG

## 2023-05-26 DIAGNOSIS — J02.9 PHARYNGITIS, UNSPECIFIED ETIOLOGY: ICD-10-CM

## 2023-05-26 DIAGNOSIS — E66.01 MORBID OBESITY (H): ICD-10-CM

## 2023-05-26 DIAGNOSIS — J06.9 UPPER RESPIRATORY TRACT INFECTION, UNSPECIFIED TYPE: Primary | ICD-10-CM

## 2023-05-26 DIAGNOSIS — K51.218 ULCERATIVE PROCTITIS WITH OTHER COMPLICATION (H): ICD-10-CM

## 2023-05-26 PROCEDURE — 99214 OFFICE O/P EST MOD 30 MIN: CPT | Performed by: FAMILY MEDICINE

## 2023-05-26 RX ORDER — LIDOCAINE HYDROCHLORIDE 20 MG/ML
SOLUTION OROPHARYNGEAL
Qty: 100 ML | Refills: 0 | Status: SHIPPED | OUTPATIENT
Start: 2023-05-26 | End: 2023-12-05

## 2023-05-26 RX ORDER — CODEINE PHOSPHATE AND GUAIFENESIN 10; 100 MG/5ML; MG/5ML
1-2 SOLUTION ORAL EVERY 4 HOURS PRN
Qty: 118 ML | Refills: 0 | Status: SHIPPED | OUTPATIENT
Start: 2023-05-26 | End: 2023-12-05

## 2023-05-26 NOTE — PROGRESS NOTES
History of Present Illness:  Kylee Cavazos is a 73 year old female here for evaluation.  Sore throat for 4 days.  Also fatigue, headache, and lost her voice.  Mild cough, worse at night, bothersome.  No chest pain or SOB.  No fever.  3 neg covid tests at home.  Good oral intake.    UC: well controlled on current meds    Review of Systems:    A 12 point ROS was completed and is as noted in HPI and otherwise negative    Objective     Physical Exam:  Vital Signs:  BP (!) 140/67   Pulse 80   Temp 99.3  F (37.4  C) (Oral)   SpO2 99%     General: Appears mildly ill,  She appears non toxic.  HEENT: Head normocephalic and atraumatic  Eyes Normal, conjunctiva normal without injection.   Ears: Canals clear bilaterally. Right TM: clear   Left TM: clear  Nose: Nares clear. No sinus tenderness  Throat:  Clear no peritonsillar masses or swelling.  Neck: Supple, no cervical lymphadenopathy, no meningeal signs.  Chest: Lungs clear to auscultation bilaterally without wheezes, rhonchi or crackles  Heart: normal S1, S2.  No murmurs, rubs or gallops  Skin appears normal without rashes.        Assessment     Impression:    1. Upper respiratory tract infection, unspecified type    2. Pharyngitis, unspecified etiology    3. Ulcerative proctitis with other complication (H)    4. Morbid obesity (H)        Plan     We discussed that nearly all upper respiratory infections are viral and that antibiotics generally do not improve outcomes.  Supportive care including fluids, cool mist humidifier, OTC analgesics, nasal irrigation, zinc acetate lozenges and honey discussed.  Reasons to follow up discussed and understood.    UC: well controlled, cont current meds and follow up    Morbid obesity:   - Discussed importance of optimizing diet, exercise and healthy weight

## 2023-07-19 ENCOUNTER — TRANSFERRED RECORDS (OUTPATIENT)
Dept: FAMILY MEDICINE | Facility: CLINIC | Age: 73
End: 2023-07-19

## 2023-09-01 ENCOUNTER — TRANSFERRED RECORDS (OUTPATIENT)
Dept: FAMILY MEDICINE | Facility: CLINIC | Age: 73
End: 2023-09-01

## 2023-10-16 DIAGNOSIS — K21.9 GASTROESOPHAGEAL REFLUX DISEASE, UNSPECIFIED WHETHER ESOPHAGITIS PRESENT: ICD-10-CM

## 2023-10-16 NOTE — TELEPHONE ENCOUNTER
Med: omeprazole     LOV (related): 12/1/22      Due for F/U around: Return in about 53 weeks (around 12/7/2023) for Annual Wellness Visit.     Next Appt: 12/5/23 with Preston

## 2023-10-17 DIAGNOSIS — E78.5 HYPERLIPIDEMIA WITH TARGET LDL LESS THAN 100: ICD-10-CM

## 2023-10-17 RX ORDER — ROSUVASTATIN CALCIUM 20 MG/1
20 TABLET, COATED ORAL EVERY EVENING
Qty: 90 TABLET | Refills: 3 | Status: SHIPPED | OUTPATIENT
Start: 2023-10-17 | End: 2024-07-13

## 2023-10-17 NOTE — TELEPHONE ENCOUNTER
Med: Rosuvastatin      LOV (related): 12/1/22    Last Lab: 12/1/22      Due for F/U around: Return in about 53 weeks (around 12/7/2023) for Annual Wellness Visit.     Next Appt: 12/5/23 with Preston           Cholesterol   Date Value Ref Range Status   12/01/2022 114 100 - 199 mg/dL Final   11/24/2021 148 100 - 199 mg/dL Final     HDL Cholesterol   Date Value Ref Range Status   12/01/2022 42 >39 mg/dL Final   11/24/2021 48 >39 mg/dL Final     LDL Cholesterol Calculated   Date Value Ref Range Status   12/01/2022 57 0 - 99 mg/dL Final   11/24/2021 83 0 - 99 mg/dL Final     Triglycerides   Date Value Ref Range Status   12/01/2022 73 0 - 149 mg/dL Final   11/24/2021 91 0 - 149 mg/dL Final     Cholesterol/HDL Ratio   Date Value Ref Range Status   04/20/2010 2.9 0.0 - 5.0 Final   06/02/2009 3.7 <4.4 CALC Final

## 2023-11-06 DIAGNOSIS — M85.80 OSTEOPENIA, UNSPECIFIED LOCATION: ICD-10-CM

## 2023-11-06 RX ORDER — ALENDRONATE SODIUM 70 MG/1
TABLET ORAL
Qty: 12 TABLET | Refills: 1 | Status: SHIPPED | OUTPATIENT
Start: 2023-11-06 | End: 2024-01-30

## 2023-12-01 NOTE — PROGRESS NOTES
"SUBJECTIVE:   Kylee Cavazos is a 73 year old female who presents for Preventive Visit.     Patient has been advised of split billing requirements and indicates understanding: Yes  Are you in the first 12 months of your Medicare Part B coverage?  No    Physical Health:  In general, how would you rate your overall physical health? good  Outside of work, how many days during the week do you exercise? 2-3 days/week  Outside of work, approximately how many minutes a day do you exercise?less than 15 minutes  If you drink alcohol do you typically have >3 drinks per day or >7 drinks per week? No  Do you usually eat at least 4 servings of fruit and vegetables a day, include whole grains & fiber and avoid regularly eating high fat or \"junk\" foods? Yes  Do you have any problems taking medications regularly?  No  Do you have any side effects from medications? none  Needs assistance for the following daily activities: no assistance needed  Which of the following safety concerns are present in your home?  none identified   Hearing impairment: No  In the past 6 months, have you been bothered by leaking of urine? yes  Healthy Habits:  Have you had an eye exam in the past two years? Yes   Do you see a dentist twice per year? Yes   What is your current smoking status? Denies  Do you use smokeless tobacco? Denies  Abuse: Current or Past(Physical, Sexual or Emotional)- No       Right Ear:     500 Hz : Pass   1000 Hz: Pass   2000 Hz: Pass   4000 Hz: Fail  Left Ear:     500 Hz : Fail   1000 Hz: Pass   2000 Hz: Fail   4000 Hz: Fail    Mental Health:  In general, how would you rate your overall mental or emotional health? good  PHQ-2 Score:      Do you feel safe in your environment? Yes    Fall risk:  Fallen 2 or more times in the past year?: No  Any fall with injury in the past year?: No    Cognitive Screenin) Repeat 3 items (Leader, Season, Table)    2) Clock draw: NORMAL  3) 3 item recall: Recalls 3 objects  Results: 3 " "items recalled: COGNITIVE IMPAIRMENT LESS LIKELY    Mini-CogTM Copyright [employer]. Licensed by the author for use in [facility]; reprinted with permission (jonah@[facility]). All rights reserved.      OBJECTIVE:   /77   Pulse 54   Ht 1.575 m (5' 2\")   Wt 92.1 kg (203 lb)   SpO2 98%   BMI 37.13 kg/m   Estimated body mass index is 37.13 kg/m  as calculated from the following:    Height as of this encounter: 1.575 m (5' 2\").    Weight as of this encounter: 92.1 kg (203 lb).      ASSESSMENT / PLAN:     Encounter for Medicare annual wellness exam    Failed hearing screening  Cerumen removal bilaterally today. Will proceed to formal audiology: patient mosqueda continued concerns for hearing.   -     Adult Audiology  Referral; Future    COUNSELING:  Reviewed preventive health counseling, as reflected in patient instructions       Regular exercise       Healthy diet/nutrition       Hearing screening          "

## 2023-12-01 NOTE — PATIENT INSTRUCTIONS
Patient Education   Personalized Prevention Plan  You are due for the preventive services outlined below.  Your care team is available to assist you in scheduling these services.  If you have already completed any of these items, please share that information with your care team to update in your medical record.  Health Maintenance Due   Topic Date Due     RSV VACCINE (Pregnancy & 60+) (1 - 1-dose 60+ series) Never done     Flu Vaccine (1) 09/01/2023     COVID-19 Vaccine (7 - 2023-24 season) 09/01/2023     FALL RISK ASSESSMENT  12/01/2023     Osteoporosis Screening  12/16/2023

## 2023-12-05 ENCOUNTER — OFFICE VISIT (OUTPATIENT)
Dept: FAMILY MEDICINE | Facility: CLINIC | Age: 73
End: 2023-12-05

## 2023-12-05 VITALS
OXYGEN SATURATION: 98 % | HEIGHT: 62 IN | WEIGHT: 203 LBS | DIASTOLIC BLOOD PRESSURE: 77 MMHG | SYSTOLIC BLOOD PRESSURE: 139 MMHG | BODY MASS INDEX: 37.36 KG/M2 | HEART RATE: 54 BPM

## 2023-12-05 DIAGNOSIS — R94.120 FAILED HEARING SCREENING: ICD-10-CM

## 2023-12-05 DIAGNOSIS — E66.01 MORBID OBESITY (H): ICD-10-CM

## 2023-12-05 DIAGNOSIS — Z13.6 SCREENING FOR CARDIOVASCULAR CONDITION: ICD-10-CM

## 2023-12-05 DIAGNOSIS — Z00.00 ENCOUNTER FOR MEDICARE ANNUAL WELLNESS EXAM: Primary | ICD-10-CM

## 2023-12-05 DIAGNOSIS — Z87.19 HISTORY OF ULCERATIVE COLITIS: ICD-10-CM

## 2023-12-05 DIAGNOSIS — H61.23 BILATERAL IMPACTED CERUMEN: ICD-10-CM

## 2023-12-05 DIAGNOSIS — K21.9 GASTROESOPHAGEAL REFLUX DISEASE WITHOUT ESOPHAGITIS: ICD-10-CM

## 2023-12-05 DIAGNOSIS — Z86.73 HISTORY OF STROKE: ICD-10-CM

## 2023-12-05 PROBLEM — K51.218: Status: RESOLVED | Noted: 2017-06-23 | Resolved: 2023-12-05

## 2023-12-05 PROBLEM — Z83.79 FAMILY HISTORY OF ULCERATIVE PROCTITIS: Status: ACTIVE | Noted: 2017-05-03

## 2023-12-05 PROBLEM — D50.0 IRON DEFICIENCY ANEMIA DUE TO CHRONIC BLOOD LOSS: Status: RESOLVED | Noted: 2021-11-30 | Resolved: 2023-12-05

## 2023-12-05 LAB
ANION GAP SERPL CALCULATED.3IONS-SCNC: 8 MMOL/L (ref 7–15)
BUN SERPL-MCNC: 13.7 MG/DL (ref 8–23)
CALCIUM SERPL-MCNC: 9.3 MG/DL (ref 8.8–10.2)
CHLORIDE SERPL-SCNC: 104 MMOL/L (ref 98–107)
CHOLESTEROL: 113 MG/DL (ref 100–199)
CREAT SERPL-MCNC: 0.93 MG/DL (ref 0.51–0.95)
DEPRECATED HCO3 PLAS-SCNC: 28 MMOL/L (ref 22–29)
EGFRCR SERPLBLD CKD-EPI 2021: 64 ML/MIN/1.73M2
FASTING?: YES
GLUCOSE SERPL-MCNC: 89 MG/DL (ref 70–99)
HDL (RMG): 40 MG/DL (ref 40–?)
LDL CALCULATED (RMG): 58 MG/DL (ref 0–130)
POTASSIUM SERPL-SCNC: 4.2 MMOL/L (ref 3.4–5.3)
SODIUM SERPL-SCNC: 140 MMOL/L (ref 135–145)
TRIGLYCERIDES (RMG): 74 MG/DL (ref 0–149)
TSH SERPL DL<=0.005 MIU/L-ACNC: 2.7 UIU/ML (ref 0.3–4.2)

## 2023-12-05 PROCEDURE — G0439 PPPS, SUBSEQ VISIT: HCPCS | Performed by: FAMILY MEDICINE

## 2023-12-05 PROCEDURE — 69209 REMOVE IMPACTED EAR WAX UNI: CPT | Mod: 59 | Performed by: FAMILY MEDICINE

## 2023-12-05 PROCEDURE — 84443 ASSAY THYROID STIM HORMONE: CPT | Mod: ORL | Performed by: FAMILY MEDICINE

## 2023-12-05 PROCEDURE — 99214 OFFICE O/P EST MOD 30 MIN: CPT | Mod: 25 | Performed by: FAMILY MEDICINE

## 2023-12-05 PROCEDURE — 80048 BASIC METABOLIC PNL TOTAL CA: CPT | Performed by: FAMILY MEDICINE

## 2023-12-05 PROCEDURE — 80061 LIPID PANEL: CPT | Mod: QW | Performed by: FAMILY MEDICINE

## 2023-12-05 PROCEDURE — 36415 COLL VENOUS BLD VENIPUNCTURE: CPT | Performed by: FAMILY MEDICINE

## 2023-12-05 RX ORDER — AMOXICILLIN 500 MG/1
500 CAPSULE ORAL PRN
COMMUNITY
Start: 2023-11-21

## 2023-12-05 NOTE — PROGRESS NOTES
"SUBJECTIVE:    This 73 year old female presents with the following concerns:     1. BMI 37, hypercholesterolemia, h/o CVA : taking statin, ASA 81 mg.   2. Osteopenia with increased FRAX score : fosamax 70 mg weekly. Previously took 2011 to 2016. Due recheck DEXA 2024  3. H/o proctosigmoiditis. Has used mesalamine in the past. GERD : takes PPI daily. Follows with GI.   4. Cerumen impaction : bilaterally.     Health Maintenance:  Health maintenance alerts were reviewed and updated as appropriate.  Colorectal cancer screening: UTD      OBJECTIVE:  Vitals:    12/05/23 1031   BP: 139/77   Pulse: 54   SpO2: 98%   Weight: 92.1 kg (203 lb)   Height: 1.575 m (5' 2\")    Body mass index is 37.13 kg/m .  General: no acute distress, cooperative with exam.  Neck:  Supple, no lymphadenopathy or thyromegaly   Ears: canals bilaterally with moderate soft wax occluding vision of TM's   Lungs: clear to auscultation bilaterally, normal respiratory effort.  Heart:  RRR without murmurs, rubs or gallops.  Normal S1 and S2.  Abdomen: normal bowel sounds, nontender, no palpable organomegaly.  Skin:  No lesions.  No rashes.  Extremities: warm, perfused, no swelling or edema.  Neuro:  CN II-XII intact, motor & sensory function all intact.    Psych: mental status normal, mood and affect appropriate.          11/25/2020     9:32 AM   PHQ   PHQ-9 Total Score 2   Q9: Thoughts of better off dead/self-harm past 2 weeks Not at all       ASSESSMENT / PLAN:      Obesity (BMI 35.0-39.9) with comorbidity (H)  Morbid obesity (H)  History of stroke  BMI >35 plus comorbidity of hypercholesterolemia, h/o CVA.  Discussed physical activity and improved nutrition  Screen for thyroid function abnormality and elevated fasting glucose.   -     Basic metabolic panel; Future  -     TSH; Future    Gastroesophageal reflux disease without esophagitis  History of ulcerative colitis  Has used mesalamine in the past. GERD : takes PPI daily. Follows with GI.     Screening " for cardiovascular condition  -     Lipid Profile ([facility])  -     VENOUS COLLECTION    Bilateral impacted cerumen  -     ND REMOVAL IMPACTED CERUMEN IRRIGATION/LVG UNILAT  Cerumen irrigated by nursing staff.  Post-procedure evaluation clear.  Patient tolerated procedure well.   Otoscopy after ear lavage: left and right canals clear. Bilateral TM's non erythematous.

## 2023-12-05 NOTE — NURSING NOTE
Bilateral ear irrigation performed: Cerumen successfully removed. Patient tolerated procedure well.

## 2023-12-06 ENCOUNTER — TRANSFERRED RECORDS (OUTPATIENT)
Dept: FAMILY MEDICINE | Facility: CLINIC | Age: 73
End: 2023-12-06

## 2024-01-17 ENCOUNTER — TRANSFERRED RECORDS (OUTPATIENT)
Dept: FAMILY MEDICINE | Facility: CLINIC | Age: 74
End: 2024-01-17

## 2024-01-30 DIAGNOSIS — M85.80 OSTEOPENIA, UNSPECIFIED LOCATION: ICD-10-CM

## 2024-01-30 RX ORDER — ALENDRONATE SODIUM 70 MG/1
TABLET ORAL
Qty: 12 TABLET | Refills: 1 | Status: SHIPPED | OUTPATIENT
Start: 2024-01-30

## 2024-01-30 NOTE — TELEPHONE ENCOUNTER
Med: Alendronate 70mg Q7days    LOV (related): 12/05/2023      Due for F/U around: 12/05/2024    Next Appt: Not scheduled

## 2024-03-28 ENCOUNTER — OFFICE VISIT (OUTPATIENT)
Dept: FAMILY MEDICINE | Facility: CLINIC | Age: 74
End: 2024-03-28

## 2024-03-28 VITALS — DIASTOLIC BLOOD PRESSURE: 68 MMHG | HEART RATE: 59 BPM | SYSTOLIC BLOOD PRESSURE: 116 MMHG | OXYGEN SATURATION: 98 %

## 2024-03-28 DIAGNOSIS — S46.211A SPRAIN, BICEP, RIGHT, INITIAL ENCOUNTER: Primary | ICD-10-CM

## 2024-03-28 DIAGNOSIS — E66.01 MORBID OBESITY (H): ICD-10-CM

## 2024-03-28 PROCEDURE — 99213 OFFICE O/P EST LOW 20 MIN: CPT

## 2024-03-28 PROCEDURE — G2211 COMPLEX E/M VISIT ADD ON: HCPCS

## 2024-03-28 NOTE — PROGRESS NOTES
"  Assessment & Plan     Sprain, bicep, right, initial encounter  Patient presents for right upper arm swelling and ecchymosis. Ddx include bicep sprain or strain or bicep tear. Distal tendon bicep squeeze test negative. Hook test done to assess integrity of bicep. Favor bicep sprain at this time. Reviewed symptomatic management with rest, icing, compression and elevation as needed. May use OTC analgesics as needed for discomfort. Reviewed follow up and discussed no improvement in symptom may consider PT or ortho evaluation. Red flags that warrant emergent evaluation discussed. Patient agreeable to plan. All questions answered.      Morbid obesity (H)  Declined weight today. Discussed importance of a healthy weight, along with diet and exercise.          BMI  Estimated body mass index is 37.13 kg/m  as calculated from the following:    Height as of 12/5/23: 1.575 m (5' 2\").    Weight as of 12/5/23: 92.1 kg (203 lb).   Weight management plan: Discussed healthy diet and exercise guidelines      Work on weight loss  Regular exercise  See Patient Instructions    Return if symptoms worsen or fail to improve, for Follow up.    Adina Pichardo is a 74 year old, presenting for the following health issues:  Derm Problem (Upper R arm - Last Friday: standing at counter, reached down to grab something - muscle \"went crazy\" - then shoved snow, now movement feels tight. Bruising and swollen/Icying and Tylenol)    HPI       Concern -   Onset: 6 days ago when reaching down to pick something up (not a heavy object) grabbing tin foil then was shoveling snow. Bruising to right bicep  Description: bruising and tightness with lump with swelling.   Intensity: no pain, aching. Improved with ice  Progression of Symptoms:  improving  Accompanying Signs & Symptoms: bruising, swelling and lump to right upper arm  Previous history of similar problem: none  Precipitating factors:        Worsened by: none  Alleviating factors:        Improved " by: Icing has helped  Therapies tried and outcome: Ice and Tylenol          Review of Systems  Constitutional, HEENT, cardiovascular, pulmonary, gi and gu systems are negative, except as otherwise noted.      Objective    /68   Pulse 59   SpO2 98%   There is no height or weight on file to calculate BMI.  Physical Exam   GENERAL: alert and no distress  RESP: lungs clear to auscultation - no rales, rhonchi or wheezes  CV: regular rate and rhythm, normal S1 S2, no S3 or S4, no murmur, click or rub, no peripheral edema  MS: RUE exam shows normal strength and muscle mass with swelling and ecchymosis to upper arm. No tenderness to palpation.   PSYCH: mentation appears normal, affect normal/bright        Signed Electronically by: RANJAN Reaves CNP

## 2024-07-02 ENCOUNTER — TRANSFERRED RECORDS (OUTPATIENT)
Dept: FAMILY MEDICINE | Facility: CLINIC | Age: 74
End: 2024-07-02

## 2024-07-13 DIAGNOSIS — E78.5 HYPERLIPIDEMIA WITH TARGET LDL LESS THAN 100: ICD-10-CM

## 2024-07-13 DIAGNOSIS — K21.9 GASTROESOPHAGEAL REFLUX DISEASE, UNSPECIFIED WHETHER ESOPHAGITIS PRESENT: ICD-10-CM

## 2024-07-13 NOTE — CONFIDENTIAL NOTE
Med: ROSUVASTATIN & OMEPRAZOLE    LOV (related): 12/5/23 - CPX    Last Lab: 12/5/23      Due for F/U around: 12/5/23 FOR CPX    Next Appt: NONE        Cholesterol   Date Value Ref Range Status   12/05/2023 113 100 - 199 mg/dL Final   12/01/2022 114 100 - 199 mg/dL Final   11/24/2021 148 100 - 199 mg/dL Final     HDL Cholesterol   Date Value Ref Range Status   12/01/2022 42 >39 mg/dL Final   11/24/2021 48 >39 mg/dL Final     HDL   Date Value Ref Range Status   12/05/2023 40 40 mg/dL Final     LDL Cholesterol Calculated   Date Value Ref Range Status   12/01/2022 57 0 - 99 mg/dL Final   11/24/2021 83 0 - 99 mg/dL Final     LDL CALCULATED ([facility])   Date Value Ref Range Status   12/05/2023 58 0 - 130 mg/dL Final     Triglycerides   Date Value Ref Range Status   12/05/2023 74 0 - 149 mg/dL Final   12/01/2022 73 0 - 149 mg/dL Final   11/24/2021 91 0 - 149 mg/dL Final     Cholesterol/HDL Ratio   Date Value Ref Range Status   04/20/2010 2.9 0.0 - 5.0 Final   06/02/2009 3.7 <4.4 CALC Final

## 2024-07-14 RX ORDER — ROSUVASTATIN CALCIUM 20 MG/1
20 TABLET, COATED ORAL EVERY EVENING
Qty: 90 TABLET | Refills: 3 | Status: SHIPPED | OUTPATIENT
Start: 2024-07-14

## 2024-07-17 NOTE — LETTER
December 6, 2023      [facility]  [address] [place] [address]              Dear Kylee,     It was jimmie to see you at your recent appointment.     Here are your lab results.     Your kidney function and electrolytes (salts in the blood) are all within normal limits.     Your glucose level is normal (less than 100) for a fasting sample.     Your TSH (thyroid stimulating hormone) level is within normal limits. Your thyroid is functioning normally.     Your cholesterol values look wonderful. Everything is within normal limits.   Resulted Orders   Lipid Profile ([facility])   Result Value Ref Range    Cholesterol 113 100 - 199 mg/dL    HDL 40 40 mg/dL    Triglycerides 74 0 - 149 mg/dL    LDL CALCULATED ([facility]) 58 0 - 130 mg/dL    Patient Fasting? Yes    Basic metabolic panel   Result Value Ref Range    Sodium 140 135 - 145 mmol/L      Comment:      Reference intervals for this test were updated on 09/26/2023 to more accurately reflect our healthy population. There may be differences in the flagging of prior results with similar values performed with this method. Interpretation of those prior results can be made in the context of the updated reference intervals.     Potassium 4.2 3.4 - 5.3 mmol/L    Chloride 104 98 - 107 mmol/L    Carbon Dioxide (CO2) 28 22 - 29 mmol/L    Anion Gap 8 7 - 15 mmol/L    Urea Nitrogen 13.7 8.0 - 23.0 mg/dL    Creatinine 0.93 0.51 - 0.95 mg/dL    GFR Estimate 64 >60 mL/min/1.73m2    Calcium 9.3 8.8 - 10.2 mg/dL    Glucose 89 70 - 99 mg/dL   TSH   Result Value Ref Range    TSH 2.70 0.30 - 4.20 uIU/mL       If you have any questions or concerns, please call the clinic at the number listed above.       Sincerely,      Sera Johnston MD             reviewed Reviewed

## 2024-09-12 ENCOUNTER — TRANSFERRED RECORDS (OUTPATIENT)
Dept: FAMILY MEDICINE | Facility: CLINIC | Age: 74
End: 2024-09-12

## 2024-11-07 DIAGNOSIS — M85.80 OSTEOPENIA, UNSPECIFIED LOCATION: ICD-10-CM

## 2024-11-07 NOTE — TELEPHONE ENCOUNTER
Med: Alendronate    LOV (related): 12/5/23 CPX      Due for F/U around: 12/5/24    Next Appt: 12/6/24 CPX

## 2024-11-08 DIAGNOSIS — K21.9 GASTROESOPHAGEAL REFLUX DISEASE, UNSPECIFIED WHETHER ESOPHAGITIS PRESENT: ICD-10-CM

## 2024-11-08 DIAGNOSIS — E78.5 HYPERLIPIDEMIA WITH TARGET LDL LESS THAN 100: ICD-10-CM

## 2024-11-08 RX ORDER — ROSUVASTATIN CALCIUM 20 MG/1
20 TABLET, COATED ORAL EVERY EVENING
Qty: 90 TABLET | Refills: 3 | Status: CANCELLED | OUTPATIENT
Start: 2024-11-08

## 2024-11-08 RX ORDER — ALENDRONATE SODIUM 70 MG/1
TABLET ORAL
Qty: 12 TABLET | Refills: 1 | Status: SHIPPED | OUTPATIENT
Start: 2024-11-08

## 2024-11-08 NOTE — TELEPHONE ENCOUNTER
1 year supply of rosuvastatin and omeprazole sent to the pharmacy in July. Advised patient to call pharmacy for refills and call us back with any problems.

## 2024-11-08 NOTE — TELEPHONE ENCOUNTER
Will be out of medication before her appointment    Rosuvastatin  Omerazole    [place]

## 2024-12-06 PROBLEM — Z83.79 FAMILY HISTORY OF ULCERATIVE PROCTITIS: Status: RESOLVED | Noted: 2017-05-03 | Resolved: 2024-12-06

## 2024-12-06 PROCEDURE — 80053 COMPREHEN METABOLIC PANEL: CPT | Mod: ORL | Performed by: FAMILY MEDICINE

## 2024-12-12 ENCOUNTER — TRANSFERRED RECORDS (OUTPATIENT)
Dept: FAMILY MEDICINE | Facility: CLINIC | Age: 74
End: 2024-12-12

## 2025-01-24 ENCOUNTER — OFFICE VISIT (OUTPATIENT)
Dept: FAMILY MEDICINE | Facility: CLINIC | Age: 75
End: 2025-01-24

## 2025-01-24 VITALS
SYSTOLIC BLOOD PRESSURE: 130 MMHG | DIASTOLIC BLOOD PRESSURE: 78 MMHG | BODY MASS INDEX: 38.1 KG/M2 | HEART RATE: 64 BPM | OXYGEN SATURATION: 97 % | HEIGHT: 62 IN

## 2025-01-24 DIAGNOSIS — K51.218 ULCERATIVE (CHRONIC) PROCTITIS WITH OTHER COMPLICATION (H): ICD-10-CM

## 2025-01-24 DIAGNOSIS — M79.601 PAIN OF RIGHT UPPER EXTREMITY: Primary | ICD-10-CM

## 2025-01-24 PROCEDURE — G2211 COMPLEX E/M VISIT ADD ON: HCPCS | Performed by: FAMILY MEDICINE

## 2025-01-24 PROCEDURE — 99213 OFFICE O/P EST LOW 20 MIN: CPT | Performed by: FAMILY MEDICINE

## 2025-01-24 NOTE — PROGRESS NOTES
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 Intermittent, since March 2024. Initial injury : reached down to put something in the oven and felt a 'snapping' sensation, followed by a \"hard bulge\" appearing in anterior upper arm. Assessed in primary care : iced and monitored. Since then has noticed weakness on reaching overhead. Unable to abduct. Upper arms appear asymmetric.       OBJECTIVE:  Vitals:    01/24/25 1420   BP: 130/78   Pulse: 64   SpO2: 97%   Height: 1.581 m (5' 2.25\")    Body mass index is 38.1 kg/m .  General: no acute distress, cooperative with exam.  Extremities: right arm : 3/5 power on flexion at elbow. Limited forward flexion at shoulder. Defect in anterior right arm.     ASSESSMENT / PLAN:      Pain of right upper extremity  Suspicious for biceps tendon rupture.   Proceed to MRI to evaluate.  Discussed referral to ortho if this the case   -     MR Humerus Upper Arm Right wo Contrast; Future    Ulcerative (chronic) proctitis with other complication (H)  H/o proctosigmoiditis. Has used mesalamine in the past. GERD : takes PPI daily. Follows with GI. Colonoscopy as per GI.     Follow up:  Next steps based on imaging findings.    The longitudinal plan of care for the diagnosis(es)/condition(s) as documented were addressed during this visit. Due to the added complexity in care, I will continue to support Kylee in the subsequent management and with ongoing continuity of care.      "

## 2025-02-05 ENCOUNTER — TELEPHONE (OUTPATIENT)
Dept: FAMILY MEDICINE | Facility: CLINIC | Age: 75
End: 2025-02-05

## 2025-02-05 ENCOUNTER — ANCILLARY PROCEDURE (OUTPATIENT)
Dept: MRI IMAGING | Facility: CLINIC | Age: 75
End: 2025-02-05
Attending: FAMILY MEDICINE
Payer: COMMERCIAL

## 2025-02-05 DIAGNOSIS — S46.011S TRAUMATIC COMPLETE TEAR OF RIGHT ROTATOR CUFF, SEQUELA: Primary | ICD-10-CM

## 2025-02-05 DIAGNOSIS — M79.601 PAIN OF RIGHT UPPER EXTREMITY: ICD-10-CM

## 2025-02-05 PROCEDURE — 73218 MRI UPPER EXTREMITY W/O DYE: CPT | Mod: RT

## 2025-02-05 PROCEDURE — 73218 MRI UPPER EXTREMITY W/O DYE: CPT | Mod: 26 | Performed by: RADIOLOGY

## 2025-02-05 NOTE — TELEPHONE ENCOUNTER
Called patient with MRI  result : Likely complete tears of the supraspinatus and infraspinatus.     Recommend referral to orthopedic surgery. Recommend Dr. Anastasiya Feldman at [facility].

## 2025-03-10 ENCOUNTER — TRANSFERRED RECORDS (OUTPATIENT)
Dept: FAMILY MEDICINE | Facility: CLINIC | Age: 75
End: 2025-03-10

## 2025-06-13 ENCOUNTER — OFFICE VISIT (OUTPATIENT)
Dept: FAMILY MEDICINE | Facility: CLINIC | Age: 75
End: 2025-06-13

## 2025-06-13 VITALS
HEART RATE: 104 BPM | DIASTOLIC BLOOD PRESSURE: 63 MMHG | SYSTOLIC BLOOD PRESSURE: 135 MMHG | OXYGEN SATURATION: 93 % | TEMPERATURE: 99.5 F

## 2025-06-13 DIAGNOSIS — R05.1 ACUTE COUGH: ICD-10-CM

## 2025-06-13 DIAGNOSIS — U07.1 INFECTION DUE TO 2019 NOVEL CORONAVIRUS: Primary | ICD-10-CM

## 2025-06-13 LAB
INFLUENZA A (RMG): NEGATIVE
INFLUENZA B (RMG): NEGATIVE
SARS ANTIGEN (RMG): POSITIVE

## 2025-06-13 PROCEDURE — 3075F SYST BP GE 130 - 139MM HG: CPT | Performed by: FAMILY MEDICINE

## 2025-06-13 PROCEDURE — 87428 SARSCOV & INF VIR A&B AG IA: CPT | Performed by: FAMILY MEDICINE

## 2025-06-13 PROCEDURE — 3078F DIAST BP <80 MM HG: CPT | Performed by: FAMILY MEDICINE

## 2025-06-13 PROCEDURE — 99213 OFFICE O/P EST LOW 20 MIN: CPT | Performed by: FAMILY MEDICINE

## 2025-06-13 PROCEDURE — G2211 COMPLEX E/M VISIT ADD ON: HCPCS | Performed by: FAMILY MEDICINE

## 2025-06-13 RX ORDER — MESALAMINE 4 G/60ML
SUSPENSION RECTAL
COMMUNITY
Start: 2025-05-29

## 2025-06-13 NOTE — PROGRESS NOTES
History of Present Illness:  Kylee Cavazos is a 75 year old female here for evaluation.  1-2 days of cough, body aches, chills and sore throat.  Temp 100.  Home covid test negative.  No chest pain or shortness of breath.  No rash.  No other concerns.        Review of Systems:    A 12 point ROS was completed and is as noted in HPI and otherwise negative    Objective     Physical Exam:  Vital Signs:  /63 (BP Location: Left arm)   Pulse 104   Temp 99.5  F (37.5  C) (Oral)   SpO2 93%     General: Appears mildly ill,  She appears non toxic.  HEENT: Head normocephalic and atraumatic  Eyes Normal, conjunctiva normal without injection.   Ears: Canals clear bilaterally. Right TM: clear   Left TM: clear  Nose: Nares congested. No sinus tenderness  Throat:  Clear no peritonsillar masses or swelling.  Neck: Supple, no cervical lymphadenopathy, no meningeal signs.  Chest: Lungs clear to auscultation bilaterally without wheezes, rhonchi or crackles  Heart: normal S1, S2.  No murmurs, rubs or gallops  Skin appears normal without rashes.        Assessment     Impression:    1. Infection due to 2019 novel coronavirus    2. Acute cough        Plan     Discussed diagnosis, pathophysiology, symptomatic cares and potential harms and benefits of antivirals. She is nontoxic appearing with no red flags. We discussed what is know and what is not known regarding these medication treatments.  After a thorough discussion she has elected to proceed with treatment.  Medication interactions and adjustments discussed.    Patient is agreement with the assessment and plan as outlined above.  All questions answered.  Red flag symptoms that should prompt emergent evaluation discussed and understood.

## 2025-07-02 ENCOUNTER — TRANSFERRED RECORDS (OUTPATIENT)
Dept: FAMILY MEDICINE | Facility: CLINIC | Age: 75
End: 2025-07-02

## 2025-07-03 ENCOUNTER — TRANSFERRED RECORDS (OUTPATIENT)
Dept: FAMILY MEDICINE | Facility: CLINIC | Age: 75
End: 2025-07-03

## 2025-07-15 ENCOUNTER — TRANSFERRED RECORDS (OUTPATIENT)
Dept: FAMILY MEDICINE | Facility: CLINIC | Age: 75
End: 2025-07-15

## 2025-07-29 ENCOUNTER — TRANSFERRED RECORDS (OUTPATIENT)
Dept: FAMILY MEDICINE | Facility: CLINIC | Age: 75
End: 2025-07-29

## 2025-08-12 ENCOUNTER — TRANSFERRED RECORDS (OUTPATIENT)
Dept: FAMILY MEDICINE | Facility: CLINIC | Age: 75
End: 2025-08-12

## 2025-08-12 DIAGNOSIS — K21.9 GASTROESOPHAGEAL REFLUX DISEASE, UNSPECIFIED WHETHER ESOPHAGITIS PRESENT: ICD-10-CM

## 2025-08-12 RX ORDER — OMEPRAZOLE 20 MG/1
20 CAPSULE, DELAYED RELEASE ORAL DAILY
Qty: 90 CAPSULE | Refills: 1 | Status: SHIPPED | OUTPATIENT
Start: 2025-08-12

## 2025-09-02 ENCOUNTER — OFFICE VISIT (OUTPATIENT)
Age: 75
End: 2025-09-02

## 2025-09-02 VITALS
SYSTOLIC BLOOD PRESSURE: 111 MMHG | DIASTOLIC BLOOD PRESSURE: 76 MMHG | OXYGEN SATURATION: 96 % | HEART RATE: 104 BPM | BODY MASS INDEX: 35.45 KG/M2 | WEIGHT: 195.4 LBS

## 2025-09-02 DIAGNOSIS — R53.83 FATIGUE, UNSPECIFIED TYPE: ICD-10-CM

## 2025-09-02 DIAGNOSIS — K51.218 ULCERATIVE (CHRONIC) PROCTITIS WITH OTHER COMPLICATION (H): Primary | ICD-10-CM

## 2025-09-02 DIAGNOSIS — R60.0 BILATERAL LOWER EXTREMITY EDEMA: ICD-10-CM

## 2025-09-02 DIAGNOSIS — R00.0 TACHYCARDIA: ICD-10-CM

## 2025-09-02 PROCEDURE — 3074F SYST BP LT 130 MM HG: CPT | Performed by: FAMILY MEDICINE

## 2025-09-02 PROCEDURE — 3078F DIAST BP <80 MM HG: CPT | Performed by: FAMILY MEDICINE

## 2025-09-02 PROCEDURE — G2211 COMPLEX E/M VISIT ADD ON: HCPCS | Performed by: FAMILY MEDICINE

## 2025-09-02 PROCEDURE — 93000 ELECTROCARDIOGRAM COMPLETE: CPT | Performed by: FAMILY MEDICINE

## 2025-09-02 PROCEDURE — 99214 OFFICE O/P EST MOD 30 MIN: CPT | Performed by: FAMILY MEDICINE

## (undated) DEVICE — GLOVE PROTEXIS MICRO 7.0  2D73PM70

## (undated) DEVICE — SOL WATER IRRIG 1000ML BOTTLE 2F7114

## (undated) DEVICE — DRILL BIT S&N FLEXIBLE REFLECTION 50MM 71362950

## (undated) DEVICE — NDL 18GA 1.5" 305196

## (undated) DEVICE — PAD FOAM MCGUIRE KIT 0814-0150

## (undated) DEVICE — SU ETHIBOND 1 CT-1 30" X425H

## (undated) DEVICE — ESU GROUND PAD UNIVERSAL W/O CORD

## (undated) DEVICE — SU NDL MAYO 1/2 216703

## (undated) DEVICE — SOLUTION WOUND CLEANSING 3/4OZ 10% PVP EA-L3011FB-50

## (undated) DEVICE — GLOVE PROTEXIS W/NEU-THERA 8.0  2D73TE80

## (undated) DEVICE — NDL SPINAL 18GA 3.5" 405184

## (undated) DEVICE — LINEN TOWEL PACK X5 5464

## (undated) DEVICE — SU PDO 1 STRATAFIX 36X36CM CTX TAPERPOINT SXPD2B405

## (undated) DEVICE — MANIFOLD NEPTUNE 4 PORT 700-20

## (undated) DEVICE — DEVICE RETRIEVER HEWSON 71111579

## (undated) DEVICE — PREP CHLORAPREP 26ML TINTED ORANGE  260815

## (undated) DEVICE — SOL NACL 0.9% INJ 1000ML BAG 2B1324X

## (undated) DEVICE — BLADE SAW SAGITTAL STRK 18X90X1.27MM HD SYS 6 6118-127-090

## (undated) DEVICE — SU FIBERWIRE 5 CCS-1 BLUE  AR-7211

## (undated) DEVICE — GLOVE PROTEXIS BLUE W/NEU-THERA 7.5  2D73EB75

## (undated) DEVICE — SU VICRYL 2-0 CP-1 27" UND J266H

## (undated) DEVICE — BONE CLEANING TIP INTERPULSE  0210-010-000

## (undated) DEVICE — HOOD FLYTE W/PEELAWAY 408-800-100

## (undated) DEVICE — SUCTION IRR SYSTEM W/O TIP INTERPULSE HANDPIECE 0210-100-000

## (undated) DEVICE — GLOVE PROTEXIS POWDER FREE 8.5 ORTHOPEDIC 2D73ET85

## (undated) DEVICE — GLOVE PROTEXIS POWDER FREE 7.5 ORTHOPEDIC 2D73ET75

## (undated) DEVICE — SYR 30ML LL W/O NDL 302832

## (undated) DEVICE — SOL NACL 0.9% IRRIG 3000ML BAG 2B7477

## (undated) DEVICE — DRAPE IOBAN INCISE 23X17" 6650EZ

## (undated) DEVICE — Device

## (undated) DEVICE — SU STRATAFIX PDS PLUS 0 SPIRAL CT-1 15CM SXPP1B406

## (undated) DEVICE — PACK TOTAL HIP W/POUCH SOP15HPFSM

## (undated) DEVICE — CLOSURE SYS SKIN PREMIERPRO EXOFIN FUSION 4X22CM STRL 3472

## (undated) DEVICE — GLOVE SENSICARE PI MICRO PF 8.0 LATEX FREE MSG9680

## (undated) DEVICE — IMM PILLOW ABDUCT HIP MED 31143061

## (undated) DEVICE — SOL NACL 0.9% IRRIG 1000ML BOTTLE 2F7124

## (undated) RX ORDER — GLYCOPYRROLATE 0.2 MG/ML
INJECTION, SOLUTION INTRAMUSCULAR; INTRAVENOUS
Status: DISPENSED
Start: 2021-07-13

## (undated) RX ORDER — ACETAMINOPHEN 325 MG/1
TABLET ORAL
Status: DISPENSED
Start: 2021-07-13

## (undated) RX ORDER — ONDANSETRON 2 MG/ML
INJECTION INTRAMUSCULAR; INTRAVENOUS
Status: DISPENSED
Start: 2021-07-13

## (undated) RX ORDER — PROPOFOL 10 MG/ML
INJECTION, EMULSION INTRAVENOUS
Status: DISPENSED
Start: 2021-07-13

## (undated) RX ORDER — LIDOCAINE HYDROCHLORIDE 20 MG/ML
INJECTION, SOLUTION EPIDURAL; INFILTRATION; INTRACAUDAL; PERINEURAL
Status: DISPENSED
Start: 2021-07-13

## (undated) RX ORDER — VANCOMYCIN HYDROCHLORIDE 1 G/20ML
INJECTION, POWDER, LYOPHILIZED, FOR SOLUTION INTRAVENOUS
Status: DISPENSED
Start: 2021-07-13

## (undated) RX ORDER — CEFAZOLIN SODIUM 2 G/100ML
INJECTION, SOLUTION INTRAVENOUS
Status: DISPENSED
Start: 2021-07-13

## (undated) RX ORDER — CELECOXIB 200 MG/1
CAPSULE ORAL
Status: DISPENSED
Start: 2021-07-13

## (undated) RX ORDER — TRANEXAMIC ACID 650 MG/1
TABLET ORAL
Status: DISPENSED
Start: 2021-07-13

## (undated) RX ORDER — FENTANYL CITRATE 0.05 MG/ML
INJECTION, SOLUTION INTRAMUSCULAR; INTRAVENOUS
Status: DISPENSED
Start: 2021-07-13

## (undated) RX ORDER — DEXAMETHASONE SODIUM PHOSPHATE 4 MG/ML
INJECTION, SOLUTION INTRA-ARTICULAR; INTRALESIONAL; INTRAMUSCULAR; INTRAVENOUS; SOFT TISSUE
Status: DISPENSED
Start: 2021-07-13